# Patient Record
Sex: MALE | Race: WHITE | NOT HISPANIC OR LATINO | Employment: FULL TIME | URBAN - NONMETROPOLITAN AREA
[De-identification: names, ages, dates, MRNs, and addresses within clinical notes are randomized per-mention and may not be internally consistent; named-entity substitution may affect disease eponyms.]

---

## 2021-10-06 ENCOUNTER — HOSPITAL ENCOUNTER (EMERGENCY)
Facility: HOSPITAL | Age: 36
Discharge: LEFT BEFORE DISCHARGE | End: 2021-10-06
Admitting: EMERGENCY MEDICINE

## 2021-10-06 ENCOUNTER — APPOINTMENT (OUTPATIENT)
Dept: GENERAL RADIOLOGY | Facility: HOSPITAL | Age: 36
End: 2021-10-06

## 2021-10-06 VITALS
HEART RATE: 127 BPM | OXYGEN SATURATION: 95 % | HEIGHT: 72 IN | SYSTOLIC BLOOD PRESSURE: 159 MMHG | DIASTOLIC BLOOD PRESSURE: 114 MMHG | BODY MASS INDEX: 25.06 KG/M2 | WEIGHT: 185 LBS | TEMPERATURE: 98.5 F | RESPIRATION RATE: 20 BRPM

## 2021-10-06 PROCEDURE — 99281 EMR DPT VST MAYX REQ PHY/QHP: CPT

## 2021-10-06 PROCEDURE — 73630 X-RAY EXAM OF FOOT: CPT

## 2021-10-06 NOTE — ED NOTES
MEDICAL SCREENING:    Reason for Visit: Stepped on a nail- R foot pain    Patient initially seen in triage.  The patient was advised further evaluation and diagnostic testing will be needed, some of the treatment and testing will be initiated in the lobby in order to begin the process.  The patient will be returned to the waiting area for the time being and possibly be re-assessed by a subsequent ED provider.  The patient will be brought back to the treatment area in as timely manner as possible.       Lety Izaguirre, APRN  10/06/21 [id number]

## 2025-07-09 ENCOUNTER — OFFICE VISIT (OUTPATIENT)
Dept: PSYCHIATRY | Facility: CLINIC | Age: 40
End: 2025-07-09
Payer: COMMERCIAL

## 2025-07-09 DIAGNOSIS — Z79.899 MEDICATION MANAGEMENT: ICD-10-CM

## 2025-07-09 DIAGNOSIS — F10.20 ALCOHOL USE DISORDER, SEVERE, DEPENDENCE: Primary | ICD-10-CM

## 2025-07-09 LAB
AMPHET+METHAMPHET UR QL: NEGATIVE
AMPHETAMINES UR QL: NEGATIVE
BARBITURATES UR QL SCN: NEGATIVE
BENZODIAZ UR QL SCN: NEGATIVE
BUPRENORPHINE SERPL-MCNC: NEGATIVE NG/ML
CANNABINOIDS SERPL QL: NEGATIVE
COCAINE UR QL: NEGATIVE
MDMA UR QL SCN: NEGATIVE
METHADONE UR QL SCN: NEGATIVE
MORPHINE/OPIATES SCREEN, URINE: NEGATIVE
OXYCODONE UR QL SCN: NEGATIVE
PCP UR QL SCN: NEGATIVE
PROPOXYPH UR QL SCN: NEGATIVE
TRICYCLICS UR QL SCN: NEGATIVE
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
## 2025-07-10 ENCOUNTER — OFFICE VISIT (OUTPATIENT)
Dept: PSYCHIATRY | Facility: HOSPITAL | Age: 40
End: 2025-07-10
Payer: COMMERCIAL

## 2025-07-10 DIAGNOSIS — F10.20 ALCOHOL USE DISORDER, SEVERE, DEPENDENCE: Primary | ICD-10-CM

## 2025-07-10 PROCEDURE — H0015 ALCOHOL AND/OR DRUG SERVICES: HCPCS | Performed by: SOCIAL WORKER

## 2025-07-10 NOTE — PROGRESS NOTES
NAME: Khai Perkins  DATE: 07/10/2025    [facility] Phase I  7637-5533    Services Provided    Group Psychotherapy x 1  Education/Training x 2  Activity Therapy  x 0  Individual Therapy x 0 0 minutes  Family Therapy x 0  MD Initial Visit  x 0  MD Follow-Up   x 0    Number of participants: 6    DATA:  Patient reported he was doing alright. He was looking forward using the bulldozer they had rented this week. He always enjoyed working with heavy equipment.     Individual: No    Homework: None assigned    Group 1 (Psychotherapy: Check-ins): Therapist continued facilitation of rapport building strategies between group members. Therapist asked that each patient check in with home life and recovery efforts and identify triggers, cravings, and high risk situations that arise between group sessions.  Group members discussed barriers and benefits of attending recovery meetings.  Therapist provided empathy and support during group session. Daily Reading: The Last Lecture (2008) by Christopher Anne    Group 2  (12 Steps) Discussed the spiritual principles of open-mindedness, willingness, and humility using the 12 Step Working Guide from NA.     Group 3 (Peer Support) This hour of group was facilitated by Lily Sommers, .     ASSESSMENT:    Personal Assessment 0-10 Scale (10 worst)    Anxiety:  1 (no comment)  Depression:  0 (no comment)  Cravings: 0 (no comment)     MSE within normal limits? Yes Affect: agitated  Mood: anxious  Pt Response:  open/receptive  Engaged in activity/Process and self-disclosed: suboptimal  Applies today's group topics to self: adequate  Able to give and receive feedback: adequate  Demonstrated insightful thinking: occasional and adequate  Other pt response comments:  Patient was a positive participant. They demonstrated a relatively positive attitude, a moderate degree of motivation, and adequate insight, as evidenced by his level of engagement combined with his occasional insights.  They seemed interested and attentive. They appeared to comprehend well the concepts being discussed. The patient seemed receptive of, and willing to implement, the ideas being discussed. Their thought process appeared linear and goal directed, and their speech was regular rate and rhythm.       Community Group Engagement:  No: not interested     Reported relapse since last meeting? No: no lapse     .  Office Visit on 07/09/2025   Component Date Value Ref Range Status    Amphetamine Screen, Urine 07/09/2025 Negative  Negative Final    Refer to confirmation by [facility] in scanned documents    Barbiturates Screen, Urine 07/09/2025 Negative  Negative Final    Buprenorphine, Screen, Urine 07/09/2025 Negative  Negative Final    Benzodiazepine Screen, Urine 07/09/2025 Negative  Negative Final    Cocaine Screen, Urine 07/09/2025 Negative  Negative Final    MDMA (ECSTASY) 07/09/2025 Negative  Negative Final    Methamphetamine, Ur 07/09/2025 Negative  Negative Final    Methadone Screen, Urine 07/09/2025 Negative  Negative Final    Morphine/Opiates Screen, Urine 07/09/2025 Negative  Negative Final    Oxycodone Screen, Urine 07/09/2025 Negative  Negative Final    Phencyclidine (PCP), Urine 07/09/2025 Negative  Negative Final    Propoxyphene Scree, Urine 07/09/2025 Negative  Negative Final    Tricyclic Antidepressants Screen 07/09/2025 Negative  Negative Final    THC, Screen, Urine 07/09/2025 Negative  Negative Final       Impression/Formulation:    ICD-10-CM ICD-9-CM   1. Alcohol use disorder, severe, dependence  F10.20 303.90        CLINICAL MANEUVERING/INTERVENTIONS: Therapist utilized a person-centered approach to build and maintain rapport with group member.   Therapist promoted safe nonjudgmental environment by providing group members with unconditional positive regard.  Assisted member in processing above session content; acknowledged and normalized patient's thoughts, feelings and concerns.  Allowed patient to freely  discuss issues without interruption or judgment. Provided safe, confidential environment to facilitate the development of positive therapeutic relationship and encourage open, honest communication. Therapist assisted group member with identifying and implementing healthier coping strategies and maintaining healthier boundaries. Assisted patient in identifying risk factors which would indicate the need for higher level of care including thoughts to harm self or others and/or self-harming behavior and encouraged patient to contact this office, call 911, or present to the nearest emergency room should any of these events occur. Pt agreeable to adhere to medication regimen as prescribed and report any side effects.  Discussed crisis intervention services and means to access.  Patient adamantly and convincingly denies current suicidal or homicidal ideation or perceptual disturbance.      Therapist implemented motivational interviewing techniques to assist client with exploring and resolving ambivalence associated with commitment to change behaviors.  Yes  Therapist utilized dialectical behavior techniques to teach and model emotional regulation and relaxation.  No   Therapist applied cognitive behavioral strategies to facilitate identification of maladaptive patterns of thinking and behavior.  Yes     PLAN:    Continue [facility] IOP Phase I  Aftercare:  [facility] Outpatient Phase 2      Please note that portions of this note were completed with a voice recognition program. Efforts were made to edit dictation, but occasionally words are mistranscribed.     This document signed by Lyle Laura LCSW, July 10, 2025, 17:22 EDT

## 2025-07-11 LAB
6-ACETYLMORPHINE: NOT DETECTED NG/ML
9-DELTA-THC-COOH: NOT DETECTED NG/ML
ALCOHOL, ETHYL: NOT DETECTED MG/DL
AMPHETAMINE: NOT DETECTED NG/ML
BENZODIAZ BLD QL: NOT DETECTED NG/ML
BUPRENORPHINE SAL CFM-MCNC: NOT DETECTED NG/ML
COCAINE METABOLITE: NOT DETECTED NG/ML
CREATININE: 167.2 MG/DL
EDDP SERPL QL: NOT DETECTED NG/ML
FENTANYL-EIA: NOT DETECTED NG/ML
METHAMPHETAMINE: NOT DETECTED NG/ML
OPIATES: NOT DETECTED NG/ML
OXIDANTS: NOT DETECTED UG/ML
OXYCODONE: NOT DETECTED NG/ML
PCP: NOT DETECTED NG/ML
PH: 6.1 (ref 4.5–9)
REF LAB TEST METHOD: NORMAL
SPECIFIC GRAVITY, QUAN: 1.01 (ref 1–1.03)
THC: DETECTED NG/ML

## 2025-07-14 ENCOUNTER — OFFICE VISIT (OUTPATIENT)
Dept: PSYCHIATRY | Facility: HOSPITAL | Age: 40
End: 2025-07-14
Payer: COMMERCIAL

## 2025-07-14 DIAGNOSIS — Z79.899 MEDICATION MANAGEMENT: ICD-10-CM

## 2025-07-14 DIAGNOSIS — F10.20 ALCOHOL USE DISORDER, SEVERE, DEPENDENCE: Primary | ICD-10-CM

## 2025-07-14 PROCEDURE — H0015 ALCOHOL AND/OR DRUG SERVICES: HCPCS | Performed by: SOCIAL WORKER

## 2025-07-14 NOTE — PROGRESS NOTES
NAME: Khai Perkins  DATE: 07/14/2025    [facility] Phase I  4320-2655    Services Provided    Group Psychotherapy x 1  Education/Training x 2  Activity Therapy  x 0  Individual Therapy x 0 0 minutes  Family Therapy x 0  MD Initial Visit  x 0  MD Follow-Up   x 0    Number of participants: 4    DATA:  Patient reported that he was worn out. He had been working hard all weekend. What's more, he reported that he had wanted a beer quite badly on Friday. The cravings weren't as bad on Saturday. This had been triggered by his having worked outside in the heat all day. Normally, he would have had a cold beer after a hard day's work like that. However, his father was not drinking, and they did not finish their work until after dark. This helped him to not drink on that occasion.     Individual: No    Homework: None assigned    Group 1 (Psychotherapy: Check-ins): Therapist continued facilitation of rapport building strategies between group members. Therapist asked that each patient check in with home life and recovery efforts and identify triggers, cravings, and high risk situations that arise between group sessions.  Group members discussed barriers and benefits of attending recovery meetings.  Therapist provided empathy and support during group session. Daily Reading: Man's Search for Meaning (1946) by Alphonse Medel    Group 2  (Existential) Discussed the difference between finding meaning and pursuing expedience. Therapist facilitated discussion about the difference between the two choices.     Group 3 (Bibliotherapy) Read from Man's Search for Meaning and use it as an illustration of what it means to make meaning out of suffering.     ASSESSMENT:    Personal Assessment 0-10 Scale (10 worst)    Anxiety:  2 (no comment)  Depression:  1 (no comment)  Cravings: 2 (9 on Friday)     MSE within normal limits? Yes Affect: restricted  Mood: depressed  Pt Response:  open/receptive  Engaged in activity/Process and self-disclosed:  adequate  Applies today's group topics to self: adequate  Able to give and receive feedback: adequate  Demonstrated insightful thinking: occasional and adequate  Other pt response comments:  Patient was a positive participant. They demonstrated a relatively positive attitude, a strong degree of motivation, and adequate insight, as evidenced by his level of engagement combined with his apparent difficulty remaining awake later in the group session. They seemed interested and attentive. They appeared to comprehend well the concepts being discussed. The patient seemed receptive of, and willing to implement, the ideas being discussed. Their thought process appeared linear and goal directed, and their speech was regular rate and rhythm.       Community Group Engagement:  No: not interested    Reported relapse since last meeting? No: no lapse     .  Office Visit on 07/09/2025   Component Date Value Ref Range Status    Amphetamine Screen, Urine 07/09/2025 Negative  Negative Final    Refer to confirmation by [facility] in scanned documents    Barbiturates Screen, Urine 07/09/2025 Negative  Negative Final    Buprenorphine, Screen, Urine 07/09/2025 Negative  Negative Final    Benzodiazepine Screen, Urine 07/09/2025 Negative  Negative Final    Cocaine Screen, Urine 07/09/2025 Negative  Negative Final    MDMA (ECSTASY) 07/09/2025 Negative  Negative Final    Methamphetamine, Ur 07/09/2025 Negative  Negative Final    Methadone Screen, Urine 07/09/2025 Negative  Negative Final    Morphine/Opiates Screen, Urine 07/09/2025 Negative  Negative Final    Oxycodone Screen, Urine 07/09/2025 Negative  Negative Final    Phencyclidine (PCP), Urine 07/09/2025 Negative  Negative Final    Propoxyphene Scree, Urine 07/09/2025 Negative  Negative Final    Tricyclic Antidepressants Screen 07/09/2025 Negative  Negative Final    THC, Screen, Urine 07/09/2025 Negative  Negative Final    AMPHETAMINE 07/09/2025 Not Detected  500 ng/mL ng/mL Final     METHAMPHETAMINE 07/09/2025 Not Detected  500 ng/mL ng/mL Final    FENTANYL 07/09/2025 Not Detected  1 ng/mL ng/mL Final    Barbiturates 07/09/2025 Not Detected  200 ng/mL ng/mL Final    Benzodiazepines 07/09/2025 Not Detected  200 ng/mL ng/mL Final    BUPRENORPHINE 07/09/2025 Not Detected  5.0 ng/mL ng/mL Final    ALCOHOL, ETHYL 07/09/2025 Not Detected  10 mg/dL mg/dL Final    EDDP 07/09/2025 Not Detected  100 ng/mL ng/mL Final    Opiates 07/09/2025 Not Detected  300 ng/mL ng/mL Final    6-ACETYLMORPHINE 07/09/2025 Not Detected  10 ng/mL ng/mL Final    OXYCODONE 07/09/2025 Not Detected  100 ng/mL ng/mL Final    PCP 07/09/2025 Not Detected  25 ng/mL ng/mL Final    THC 07/09/2025 Detected  50 ng/mL ng/mL Final    Cocaine Metabolite 07/09/2025 Not Detected  150 ng/mL ng/mL Final    CREATININE 07/09/2025 167.2  >20.0 mg/dL mg/dL Final    PH 07/09/2025 6.1  4.5 - 9.0 Final    OXIDANTS 07/09/2025 Not Detected  0-200 ug/mL ug/mL Final    Specific Gravity, [place] 07/09/2025 1.013  1.003 - 1.035 Final    Reference Lab Report 07/09/2025    Final    See Full Screen for results.    9-DELTA-THC-COOH 07/09/2025 Not Detected  25 ng/mL ng/mL Final       Impression/Formulation:    ICD-10-CM ICD-9-CM   1. Alcohol use disorder, severe, dependence  F10.20 303.90   2. Medication management  Z79.899 V58.69        CLINICAL MANEUVERING/INTERVENTIONS: Therapist utilized a person-centered approach to build and maintain rapport with group member.   Therapist promoted safe nonjudgmental environment by providing group members with unconditional positive regard.  Assisted member in processing above session content; acknowledged and normalized patient's thoughts, feelings and concerns.  Allowed patient to freely discuss issues without interruption or judgment. Provided safe, confidential environment to facilitate the development of positive therapeutic relationship and encourage open, honest communication. Therapist assisted group member with  identifying and implementing healthier coping strategies and maintaining healthier boundaries. Assisted patient in identifying risk factors which would indicate the need for higher level of care including thoughts to harm self or others and/or self-harming behavior and encouraged patient to contact this office, call 911, or present to the nearest emergency room should any of these events occur. Pt agreeable to adhere to medication regimen as prescribed and report any side effects.  Discussed crisis intervention services and means to access.  Patient adamantly and convincingly denies current suicidal or homicidal ideation or perceptual disturbance.      Therapist implemented motivational interviewing techniques to assist client with exploring and resolving ambivalence associated with commitment to change behaviors.  Yes  Therapist utilized dialectical behavior techniques to teach and model emotional regulation and relaxation.  No   Therapist applied cognitive behavioral strategies to facilitate identification of maladaptive patterns of thinking and behavior.  Yes     PLAN:    Continue [facility] IOP Phase I  Aftercare:  [facility] Phase 2      Please note that portions of this note were completed with a voice recognition program. Efforts were made to edit dictation, but occasionally words are mistranscribed.     This document signed by Lyle Laura LCSW, July 14, 2025, 14:03 EDT

## 2025-07-15 LAB
6-ACETYLMORPHINE: NOT DETECTED NG/ML
ALCOHOL, ETHYL: NOT DETECTED MG/DL
AMPHETAMINE: NOT DETECTED NG/ML
BENZODIAZ BLD QL: NOT DETECTED NG/ML
BUPRENORPHINE SAL CFM-MCNC: NOT DETECTED NG/ML
COCAINE METABOLITE: NOT DETECTED NG/ML
CREATININE: 33.5 MG/DL
EDDP SERPL QL: NOT DETECTED NG/ML
FENTANYL-EIA: NOT DETECTED NG/ML
METHAMPHETAMINE: NOT DETECTED NG/ML
OPIATES: NOT DETECTED NG/ML
OXIDANTS: NOT DETECTED UG/ML
OXYCODONE: NOT DETECTED NG/ML
PCP: NOT DETECTED NG/ML
PH: 6 (ref 4.5–9)
SPECIFIC GRAVITY, QUAN: 1.01 (ref 1–1.03)
THC: NOT DETECTED NG/ML

## 2025-07-16 ENCOUNTER — OFFICE VISIT (OUTPATIENT)
Dept: PSYCHIATRY | Facility: HOSPITAL | Age: 40
End: 2025-07-16
Payer: COMMERCIAL

## 2025-07-16 DIAGNOSIS — F10.20 ALCOHOL USE DISORDER, SEVERE, DEPENDENCE: Primary | ICD-10-CM

## 2025-07-16 LAB — REF LAB TEST METHOD: NORMAL

## 2025-07-16 PROCEDURE — H0015 ALCOHOL AND/OR DRUG SERVICES: HCPCS | Performed by: SOCIAL WORKER

## 2025-07-16 NOTE — PROGRESS NOTES
Subjective   Patient ID: Khai Perkins is a 40 y.o. male referred to [facility] treatment under court order..     There were no vitals taken for this visit.  CC: Alcohol use    History of Present Illness  Khai Perkins is a 40 y.o. male who is seen today for an initial evaluation in the [facility] program.  The patient reports a long history of alcohol use. First use was at age 11 and he started drinking regularly at age 19. Over time the use increased and the patient  continued to use despite negative consequences including relationship problems, social and financial problems. The patient endorses symptoms of tolerance and withdrawals and ongoing cravings to use. Has tried to cut down and stop but has not been successful. Spends too much time and resources in pursuit of substance use. Longest period of sobriety is reported to be about a year.  Last use a half a case of beer and a fifth of bourbon about six months ago and that is when he went to shelter and was out in three weeks and after that he has been drinking beer off and on and last beer drink was three weeks ago.   Withdrawal symptoms are not reported today.     The following portions of the patient's history were reviewed and updated as appropriate: allergies, current medications, past family history, past medical history, past social history, past surgical history, and problem list.    Past Psych History: None reported.     Substance Use History: See HPI. Patient reports occasional use of thc and last use was about 3 weeks ago.     Medical History: None reported.  No past medical history on file.    Medications: No current outpatient medications on file.    Review of Systems  Gen: No fever chills  Resp: No soa  GI: No nvd    Family History Patient denies any family history of alcohol and drug use. No family history on file.    Objective   Mental Status Exam  Appearance:  clean and casually dressed, appropriate  Attitude toward clinician:  cooperative and agreeable    Speech:    Rate:  regular rate and rhythm   Volume:  normal  Motor:  no abnormal movements present  Mood:  Good  Affect:  euthymic  Thought Processes:  linear, logical, and goal directed  Thought Content:  normal  Suicidal Thoughts:  absent  Homicidal Thoughts:  absent  Perceptual Disturbance: no perceptual disturbance  Attention and Concentration:  good  Insight and Judgement:  good  Memory:  memory appears to be intact    Strengths: Motivated for treatment    Weaknesses:Substance use and Poor coping skills    Short term goals: Develop rapport and encourage compliance with treatment plan and followups. Initiate appropriate treatment and monitor for efficacy and side effects and make adjustments as indicated.    Long term goals: The patient to have complete resolution of symptoms of alcohol use.      Lab Review:   Office Visit on 07/14/2025   Component Date Value Ref Range Status    AMPHETAMINE 07/14/2025 Not Detected  500 ng/mL ng/mL Final    METHAMPHETAMINE 07/14/2025 Not Detected  500 ng/mL ng/mL Final    FENTANYL 07/14/2025 Not Detected  1 ng/mL ng/mL Final    Barbiturates 07/14/2025 Not Detected  200 ng/mL ng/mL Final    Benzodiazepines 07/14/2025 Not Detected  200 ng/mL ng/mL Final    BUPRENORPHINE 07/14/2025 Not Detected  5.0 ng/mL ng/mL Final    ALCOHOL, ETHYL 07/14/2025 Not Detected  10 mg/dL mg/dL Final    EDDP 07/14/2025 Not Detected  100 ng/mL ng/mL Final    Opiates 07/14/2025 Not Detected  300 ng/mL ng/mL Final    6-ACETYLMORPHINE 07/14/2025 Not Detected  10 ng/mL ng/mL Final    OXYCODONE 07/14/2025 Not Detected  100 ng/mL ng/mL Final    PCP 07/14/2025 Not Detected  25 ng/mL ng/mL Final    THC 07/14/2025 Not Detected  50 ng/mL ng/mL Final    Cocaine Metabolite 07/14/2025 Not Detected  150 ng/mL ng/mL Final    CREATININE 07/14/2025 33.5  >20.0 mg/dL mg/dL Final    PH 07/14/2025 6.0  4.5 - 9.0 Final    OXIDANTS 07/14/2025 Not Detected  0-200 ug/mL ug/mL Final    Specific Gravity, Celestino 07/14/2025 1.007   1.003 - 1.035 Final    Reference Lab Report 07/14/2025    Final    See Full Screen for results.       Assessment & Plan   Alcohol use disorder severe        The patient is seen today for an intake assessment for the [facility] program.   Patient will be admitted to the [facility].  Patient will be provided individual and group counseling and monitored closely for sobriety. Patient will be encouraged to learn and practice healthy coping skills and to maintain health coping skills. Patient will participate in group therapy for approximately 8-10 weeks. The clinical focus of treatment will be adding coping skills to prevent recurrence of symptoms and to manage moods. Patient will be assisted with coping skills to also manage life stressors and/or cravings. Patient will be provided with psychoeducation surrounding substance misuse and any other behavioral health concerns present during treatment. Patient has been informed of the requirement to attend ongoing support group meetings and to obtain a sponsor if needed.  Patient informed of requirement of [facility] drug screenings at each contact to ensure compliance and reinforce abstinence from all illicit drugs and alcohol.  Patient was encouraged to involve family in the family education program which will be offered weekly. Patient will meet with the staff psychiatrist on a biweekly basis for medical monitoring and, if needed, medication management. Patient will be given the option to see the medical provider each week, if needed. Patient will be assisted with development of a personal recovery plan.

## 2025-07-16 NOTE — PROGRESS NOTES
NAME: Khai Perkins  DATE: 07/16/2025    [facility] Phase I  [id number]    Services Provided    Group Psychotherapy x 2  Education/Training x 1  Activity Therapy  x 0  Individual Therapy x 0 0 minutes  Family Therapy x 0  MD Initial Visit  x 1  MD Follow-Up   x 0    Number of participants: 5    DATA:  Patient reported that court went well yesterday. However, his anxiety was increasing because he was finding his to-do list piling up. It was affecting his recovery because it made him want to drink. He told his little brother and his dad that it was making him want to drink again. They were supportive.     Individual: No    Homework: None assigned    Group 1 (Psychotherapy: Check-ins): Therapist continued facilitation of rapport building strategies between group members. Therapist asked that each patient check in with home life and recovery efforts and identify triggers, cravings, and high risk situations that arise between group sessions.  Group members discussed barriers and benefits of attending recovery meetings.  Therapist provided empathy and support during group session. Daily Reading: The Great Divorce (1945) by LISHA Nuñez    Group 2  (Relapse Prevention) Explored coping skills and source of support.     Group 3 (Relapse Prevention) Explored sources of motivation. Emphasized the importance of pursuing jaky in recovery rather than exclusively running from the pain of active addiction.     ASSESSMENT:    Personal Assessment 0-10 Scale (10 worst)    Anxiety:  8 (no comment)  Depression:  2 (no comment)  Cravings: 2 (no comment)     MSE within normal limits? Yes Affect: agitated  Mood: anxious  Pt Response:  open/receptive  Engaged in activity/Process and self-disclosed: adequate  Applies today's group topics to self: adequate  Able to give and receive feedback: adequate  Demonstrated insightful thinking: occasional and suboptimal  Other pt response comments:  Patient was a positive participant. They demonstrated a  relatively positive attitude, a moderate degree of motivation, and moderate insight, as evidenced by his level of engagement combined with his having stood on two occasions during group discussion. He must have been restless, in pain, or both, and standing gave some relief. They seemed interested and attentive. They appeared to comprehend well the concepts being discussed. The patient seemed doubtful of, and somewhat willing to implement, the ideas being discussed. Their thought process appeared linear and goal directed, and their speech was regular rate and rhythm.       Community Group Engagement:  No: not engaged    Reported relapse since last meeting? No: no lapse     .  Office Visit on 07/14/2025   Component Date Value Ref Range Status    AMPHETAMINE 07/14/2025 Not Detected  500 ng/mL ng/mL Final    METHAMPHETAMINE 07/14/2025 Not Detected  500 ng/mL ng/mL Final    FENTANYL 07/14/2025 Not Detected  1 ng/mL ng/mL Final    Barbiturates 07/14/2025 Not Detected  200 ng/mL ng/mL Final    Benzodiazepines 07/14/2025 Not Detected  200 ng/mL ng/mL Final    BUPRENORPHINE 07/14/2025 Not Detected  5.0 ng/mL ng/mL Final    ALCOHOL, ETHYL 07/14/2025 Not Detected  10 mg/dL mg/dL Final    EDDP 07/14/2025 Not Detected  100 ng/mL ng/mL Final    Opiates 07/14/2025 Not Detected  300 ng/mL ng/mL Final    6-ACETYLMORPHINE 07/14/2025 Not Detected  10 ng/mL ng/mL Final    OXYCODONE 07/14/2025 Not Detected  100 ng/mL ng/mL Final    PCP 07/14/2025 Not Detected  25 ng/mL ng/mL Final    THC 07/14/2025 Not Detected  50 ng/mL ng/mL Final    Cocaine Metabolite 07/14/2025 Not Detected  150 ng/mL ng/mL Final    CREATININE 07/14/2025 33.5  >20.0 mg/dL mg/dL Final    PH 07/14/2025 6.0  4.5 - 9.0 Final    OXIDANTS 07/14/2025 Not Detected  0-200 ug/mL ug/mL Final    Specific Gravity, Celestino 07/14/2025 1.007  1.003 - 1.035 Final    Reference Lab Report 07/14/2025    Final    See Full Screen for results.   Office Visit on 07/09/2025   Component Date  Value Ref Range Status    Amphetamine Screen, Urine 07/09/2025 Negative  Negative Final    Refer to confirmation by [facility] in scanned documents    Barbiturates Screen, Urine 07/09/2025 Negative  Negative Final    Buprenorphine, Screen, Urine 07/09/2025 Negative  Negative Final    Benzodiazepine Screen, Urine 07/09/2025 Negative  Negative Final    Cocaine Screen, Urine 07/09/2025 Negative  Negative Final    MDMA (ECSTASY) 07/09/2025 Negative  Negative Final    Methamphetamine, Ur 07/09/2025 Negative  Negative Final    Methadone Screen, Urine 07/09/2025 Negative  Negative Final    Morphine/Opiates Screen, Urine 07/09/2025 Negative  Negative Final    Oxycodone Screen, Urine 07/09/2025 Negative  Negative Final    Phencyclidine (PCP), Urine 07/09/2025 Negative  Negative Final    Propoxyphene Scree, Urine 07/09/2025 Negative  Negative Final    Tricyclic Antidepressants Screen 07/09/2025 Negative  Negative Final    THC, Screen, Urine 07/09/2025 Negative  Negative Final    AMPHETAMINE 07/09/2025 Not Detected  500 ng/mL ng/mL Final    METHAMPHETAMINE 07/09/2025 Not Detected  500 ng/mL ng/mL Final    FENTANYL 07/09/2025 Not Detected  1 ng/mL ng/mL Final    Barbiturates 07/09/2025 Not Detected  200 ng/mL ng/mL Final    Benzodiazepines 07/09/2025 Not Detected  200 ng/mL ng/mL Final    BUPRENORPHINE 07/09/2025 Not Detected  5.0 ng/mL ng/mL Final    ALCOHOL, ETHYL 07/09/2025 Not Detected  10 mg/dL mg/dL Final    EDDP 07/09/2025 Not Detected  100 ng/mL ng/mL Final    Opiates 07/09/2025 Not Detected  300 ng/mL ng/mL Final    6-ACETYLMORPHINE 07/09/2025 Not Detected  10 ng/mL ng/mL Final    OXYCODONE 07/09/2025 Not Detected  100 ng/mL ng/mL Final    PCP 07/09/2025 Not Detected  25 ng/mL ng/mL Final    THC 07/09/2025 Detected  50 ng/mL ng/mL Final    Cocaine Metabolite 07/09/2025 Not Detected  150 ng/mL ng/mL Final    CREATININE 07/09/2025 167.2  >20.0 mg/dL mg/dL Final    PH 07/09/2025 6.1  4.5 - 9.0 Final    OXIDANTS 07/09/2025  Not Detected  0-200 ug/mL ug/mL Final    Specific Gravity, Celestino 07/09/2025 1.013  1.003 - 1.035 Final    Reference Lab Report 07/09/2025    Final    See Full Screen for results.    9-DELTA-THC-COOH 07/09/2025 Not Detected  25 ng/mL ng/mL Final       Impression/Formulation:    ICD-10-CM ICD-9-CM   1. Alcohol use disorder, severe, dependence  F10.20 303.90        CLINICAL MANEUVERING/INTERVENTIONS: Therapist utilized a person-centered approach to build and maintain rapport with group member.   Therapist promoted safe nonjudgmental environment by providing group members with unconditional positive regard.  Assisted member in processing above session content; acknowledged and normalized patient's thoughts, feelings and concerns.  Allowed patient to freely discuss issues without interruption or judgment. Provided safe, confidential environment to facilitate the development of positive therapeutic relationship and encourage open, honest communication. Therapist assisted group member with identifying and implementing healthier coping strategies and maintaining healthier boundaries. Assisted patient in identifying risk factors which would indicate the need for higher level of care including thoughts to harm self or others and/or self-harming behavior and encouraged patient to contact this office, call 911, or present to the nearest emergency room should any of these events occur. Pt agreeable to adhere to medication regimen as prescribed and report any side effects.  Discussed crisis intervention services and means to access.  Patient adamantly and convincingly denies current suicidal or homicidal ideation or perceptual disturbance.      Therapist implemented motivational interviewing techniques to assist client with exploring and resolving ambivalence associated with commitment to change behaviors.  Yes  Therapist utilized dialectical behavior techniques to teach and model emotional regulation and relaxation.  No    Therapist applied cognitive behavioral strategies to facilitate identification of maladaptive patterns of thinking and behavior.  Yes     PLAN:    Continue [facility] Phase I  Aftercare:  [facility] Phase 2      Please note that portions of this note were completed with a voice recognition program. Efforts were made to edit dictation, but occasionally words are mistranscribed.     This document signed by Lyle Laura LCSW, July 16, 2025, 14:08 EDT

## 2025-07-17 ENCOUNTER — OFFICE VISIT (OUTPATIENT)
Dept: PSYCHIATRY | Facility: HOSPITAL | Age: 40
End: 2025-07-17
Payer: COMMERCIAL

## 2025-07-17 DIAGNOSIS — F10.20 ALCOHOL USE DISORDER, SEVERE, DEPENDENCE: Primary | ICD-10-CM

## 2025-07-17 NOTE — PROGRESS NOTES
[address] [address]  [phone]    Intensive Outpatient Program for Chemical Dependence ([facility] IOP)      Individualized Treatment Plan    DATE: 07/17/2025  TIME: 1100         Long Term Goal:  Khai will establish a sustained recovery and will maintain total abstinence from mind-altering substances other than what is prescribed and/or approved by the attending physician. Patient will learn, practice, and utilize behavioral and cognitive coping skills to help maintain sobriety.    Patient Care Needs:  Khai presents with alcohol use disorder and is at high risk for relapse without continued treatment.  Patient has a history of using drugs/alcohol until intoxicated or passed out and has been unable to stop or cut down use of alcohol/drugs once starting, despite verbalized desire to do so, and the negative consequences from continued use.  Patient's use has negatively impacted social, occupational, and/or physical functioning. He also reported that he wanted to work on how he came across to people sometimes.     Objectives:      1.  Patient will participate in a medical evaluation to assess the effects of chemical dependence and will cooperate with an evaluation by the attending psychiatrist or psychiatric nurse practitioner for psychotropic medication if appropriate. Patient reported he did not go to the doctor very often. He was unsure about scheduling a follow up appointment at this time. He will let the treatment team know if he decides he wants to have an appointment scheduled.     2.  Patient will adhere to the IOP group rules and guidelines.    3.  Patient will attend group sessions as scheduled.  Patient will notify therapist and support staff of any absences or tardies.  Patient will provide a valid and verifiable excuse for absences to be considered excused.    4.  Patient will participate in random drug and alcohol screenings and will exhibit negative  results on said screenings.    5.  Patient will identify high risk situations, people, and places to avoid or manage in order to maintain sobriety. He stayed close to his family, and they were respectful of his recovery.     6.  Patient will participate in group discussions by giving and receiving feedback appropriately.    7.  Patient will develop a positive network of support by attending recovery groups or other spiritual fellowships each week outside of IOP group and by exploring/obtaining/maintaining sponsorship. He had been to AA a few times years ago, and he didn't like it all that much. He was open to trying again at some point.     8.  Patient will identify, practice, and implement at least 5 healthy coping strategies to manage difficult emotions while remaining abstinent. Staying busy working was a typical coping skill for him. Carpentry, guitar, hunting, fishing, hiking, or just being outside.     9.  Patient will develop relapse prevention skills and be able to identify and share them with the group in the form of a relapse prevention plan.    10.  Patient will develop a personal recovery plan and share it with the group prior to discharge.    11.  Patient will offer family participation in family education groups, if appropriate. He thought about bringing in his brother or his best friend.     12.  Patient will exhibit increased motivation to change as assessed by observable behaviors in conjunction with the ASAM assessment tool.    13.  Patient will utilize healthy coping skills to manage depressive and anxious symptoms without using alcohol or other mind-altering substances and will exhibit decreased score on the PHQ-9 and YASMANI-7.    Interventions:  Patient will attend biweekly appointments with the attending psychiatrist or psychiatric nurse practitioner for medication evaluation and management unless scheduled otherwise.  Patient will be referred to a medical provider for a physical examination if  he/she is not already established with a medical provider.  Patient will comply with recommendations and appointments with his/her medical treatment team, including medication management.  Therapist will introduce and review IOP group rules, and patient will be provided a hard copy of the group rules upon entering the program.  IOP group sessions will be offered 3-4 times per week, Mondays, Tuesdays, Wednesdays and Thursdays during Phase I of treatment, with the exception of some federal holidays. Patient will be provided with random and regular drug and alcohol screenings. Therapist will utilize motivational interviewing techniques to assist patient with exploring and resolving ambivalence associated with commitment to change behaviors related to substance use and addiction.  Therapist will utilize behavioral and cognitive techniques to assist patient with identifying and recognizing patterns of irrational beliefs that contribute to patient's risk for continued substance abuse and relapse.  Therapist will provide psychoeducation to assist patient in increasing knowledge on the disease concept and the effects of chemical dependence.  Therapist will provide and utilize evidenced-based recovery literature to assist patient in identifying healthier coping strategies.  Therapist will assist patient in developing a relapse prevention plan and a personal recovery plan.  Therapist will assist patient with exploring self-help strategies and beginning work on the 12 Steps.  Therapist will encourage patient to explore, obtain, or maintain sponsorship and/or interpersonal connection with [facility] fellowships.  Therapist will offer family education groups, if appropriate, and will encourage patient to invite family member(s) to participate. Treatment team members will provide patient with progress reports as needed.    Team Members:  Patient - Khai Perkins  Medical Provider - Zoe Painter MD  [facility] Licensed Therapist -  Lyle Laura, AKIL  IOP Director - Gilberto Stout LCSW, [facility]  Support Staff

## 2025-07-17 NOTE — PROGRESS NOTES
"   NAME: Khai Perkins  DATE: 07/17/2025    [facility] Phase I  [id number]    Services Provided    Group Psychotherapy x 1  Education/Training x 2  Activity Therapy  x 0  Individual Therapy x 1 30 minutes  Family Therapy x 0  MD Initial Visit  x 0  MD Follow-Up   x 0    Number of participants: 5    DATA:  Patient reported he was tired today. He had worked again until dark, and there was no end in sight. However, it was looking more finished than it did before, so this was satisfying. It seemed he was adept at recognizing the nuance of the situation. It was both stressful and satisfying for him.     Individual: Yes, describe: Initiated treatment plan. During the individual session, he reported, unprompted, that he came across as an \"ass hole\" sometimes. He wasn't too concerned about this, but he also recognized it as something he needed to reign in at times. He seemed to be thinking realistically about this. It is also notable that he reported abstinence to be his goal, whereas this had not necessarily been his goal during the intake session.     Homework: None assigned    Group 1 (Psychotherapy: Check-ins): Therapist continued facilitation of rapport building strategies between group members. Therapist asked that each patient check in with home life and recovery efforts and identify triggers, cravings, and high risk situations that arise between group sessions.  Group members discussed barriers and benefits of attending recovery meetings.  Therapist provided empathy and support during group session. Daily Reading: None    Group 2  (CBT) Explored the costs and benefits of demanding perfection compared to the costs and benefits of accepting progress.     Group 3 (Peer Support) This hour of group was facilitated by Lily Sommers, .     ASSESSMENT:    Personal Assessment 0-10 Scale (10 worst)    Anxiety:  5 (no comment)  Depression:  2 (no comment)  Cravings: 0 (no comment)     MSE within normal limits? " Yes Affect: somber Mood: calm  Pt Response:  open/receptive  Engaged in activity/Process and self-disclosed: adequate  Applies today's group topics to self: adequate  Able to give and receive feedback: adequate  Demonstrated insightful thinking: consistent and exceptional  Other pt response comments:  Patient was a positive participant. They demonstrated a relatively positive attitude, a strong degree of motivation, and exceptional insight, as evidenced by his level of engagement combined with the level of humility he demonstrated in recognizing his own tendencies. They seemed interested and attentive. They appeared to comprehend well the concepts being discussed. The patient seemed receptive of, and willing to implement, the ideas being discussed. Their thought process appeared linear and goal directed, and their speech was regular rate and rhythm.       Community Group Engagement:  No: not yet engaged    Reported relapse since last meeting? No: no lapse     .  Office Visit on 07/14/2025   Component Date Value Ref Range Status    AMPHETAMINE 07/14/2025 Not Detected  500 ng/mL ng/mL Final    METHAMPHETAMINE 07/14/2025 Not Detected  500 ng/mL ng/mL Final    FENTANYL 07/14/2025 Not Detected  1 ng/mL ng/mL Final    Barbiturates 07/14/2025 Not Detected  200 ng/mL ng/mL Final    Benzodiazepines 07/14/2025 Not Detected  200 ng/mL ng/mL Final    BUPRENORPHINE 07/14/2025 Not Detected  5.0 ng/mL ng/mL Final    ALCOHOL, ETHYL 07/14/2025 Not Detected  10 mg/dL mg/dL Final    EDDP 07/14/2025 Not Detected  100 ng/mL ng/mL Final    Opiates 07/14/2025 Not Detected  300 ng/mL ng/mL Final    6-ACETYLMORPHINE 07/14/2025 Not Detected  10 ng/mL ng/mL Final    OXYCODONE 07/14/2025 Not Detected  100 ng/mL ng/mL Final    PCP 07/14/2025 Not Detected  25 ng/mL ng/mL Final    THC 07/14/2025 Not Detected  50 ng/mL ng/mL Final    Cocaine Metabolite 07/14/2025 Not Detected  150 ng/mL ng/mL Final    CREATININE 07/14/2025 33.5  >20.0 mg/dL mg/dL  Final    PH 07/14/2025 6.0  4.5 - 9.0 Final    OXIDANTS 07/14/2025 Not Detected  0-200 ug/mL ug/mL Final    Specific Gravity, Celestino 07/14/2025 1.007  1.003 - 1.035 Final    Reference Lab Report 07/14/2025    Final    See Full Screen for results.   Office Visit on 07/09/2025   Component Date Value Ref Range Status    Amphetamine Screen, Urine 07/09/2025 Negative  Negative Final    Refer to confirmation by [facility] in scanned documents    Barbiturates Screen, Urine 07/09/2025 Negative  Negative Final    Buprenorphine, Screen, Urine 07/09/2025 Negative  Negative Final    Benzodiazepine Screen, Urine 07/09/2025 Negative  Negative Final    Cocaine Screen, Urine 07/09/2025 Negative  Negative Final    MDMA (ECSTASY) 07/09/2025 Negative  Negative Final    Methamphetamine, Ur 07/09/2025 Negative  Negative Final    Methadone Screen, Urine 07/09/2025 Negative  Negative Final    Morphine/Opiates Screen, Urine 07/09/2025 Negative  Negative Final    Oxycodone Screen, Urine 07/09/2025 Negative  Negative Final    Phencyclidine (PCP), Urine 07/09/2025 Negative  Negative Final    Propoxyphene Scree, Urine 07/09/2025 Negative  Negative Final    Tricyclic Antidepressants Screen 07/09/2025 Negative  Negative Final    THC, Screen, Urine 07/09/2025 Negative  Negative Final    AMPHETAMINE 07/09/2025 Not Detected  500 ng/mL ng/mL Final    METHAMPHETAMINE 07/09/2025 Not Detected  500 ng/mL ng/mL Final    FENTANYL 07/09/2025 Not Detected  1 ng/mL ng/mL Final    Barbiturates 07/09/2025 Not Detected  200 ng/mL ng/mL Final    Benzodiazepines 07/09/2025 Not Detected  200 ng/mL ng/mL Final    BUPRENORPHINE 07/09/2025 Not Detected  5.0 ng/mL ng/mL Final    ALCOHOL, ETHYL 07/09/2025 Not Detected  10 mg/dL mg/dL Final    EDDP 07/09/2025 Not Detected  100 ng/mL ng/mL Final    Opiates 07/09/2025 Not Detected  300 ng/mL ng/mL Final    6-ACETYLMORPHINE 07/09/2025 Not Detected  10 ng/mL ng/mL Final    OXYCODONE 07/09/2025 Not Detected  100 ng/mL ng/mL  Final    PCP 07/09/2025 Not Detected  25 ng/mL ng/mL Final    THC 07/09/2025 Detected  50 ng/mL ng/mL Final    Cocaine Metabolite 07/09/2025 Not Detected  150 ng/mL ng/mL Final    CREATININE 07/09/2025 167.2  >20.0 mg/dL mg/dL Final    PH 07/09/2025 6.1  4.5 - 9.0 Final    OXIDANTS 07/09/2025 Not Detected  0-200 ug/mL ug/mL Final    Specific Gravity, Celestino 07/09/2025 1.013  1.003 - 1.035 Final    Reference Lab Report 07/09/2025    Final    See Full Screen for results.    9-DELTA-THC-COOH 07/09/2025 Not Detected  25 ng/mL ng/mL Final       Impression/Formulation:    ICD-10-CM ICD-9-CM   1. Alcohol use disorder, severe, dependence  F10.20 303.90        CLINICAL MANEUVERING/INTERVENTIONS: Therapist utilized a person-centered approach to build and maintain rapport with group member.   Therapist promoted safe nonjudgmental environment by providing group members with unconditional positive regard.  Assisted member in processing above session content; acknowledged and normalized patient's thoughts, feelings and concerns.  Allowed patient to freely discuss issues without interruption or judgment. Provided safe, confidential environment to facilitate the development of positive therapeutic relationship and encourage open, honest communication. Therapist assisted group member with identifying and implementing healthier coping strategies and maintaining healthier boundaries. Assisted patient in identifying risk factors which would indicate the need for higher level of care including thoughts to harm self or others and/or self-harming behavior and encouraged patient to contact this office, call 911, or present to the nearest emergency room should any of these events occur. Pt agreeable to adhere to medication regimen as prescribed and report any side effects.  Discussed crisis intervention services and means to access.  Patient adamantly and convincingly denies current suicidal or homicidal ideation or perceptual disturbance.       Therapist implemented motivational interviewing techniques to assist client with exploring and resolving ambivalence associated with commitment to change behaviors.  Yes  Therapist utilized dialectical behavior techniques to teach and model emotional regulation and relaxation.  No   Therapist applied cognitive behavioral strategies to facilitate identification of maladaptive patterns of thinking and behavior.  Yes     PLAN:    [facility]  Aftercare:  [facility] CD Outpatient Phase 2      Please note that portions of this note were completed with a voice recognition program. Efforts were made to edit dictation, but occasionally words are mistranscribed.     This document signed by Lyle Laura LCSW, July 17, 2025, 14:59 EDT

## 2025-07-21 ENCOUNTER — OFFICE VISIT (OUTPATIENT)
Dept: PSYCHIATRY | Facility: HOSPITAL | Age: 40
End: 2025-07-21
Payer: COMMERCIAL

## 2025-07-21 DIAGNOSIS — Z79.899 MEDICATION MANAGEMENT: ICD-10-CM

## 2025-07-21 DIAGNOSIS — F10.20 ALCOHOL USE DISORDER, SEVERE, DEPENDENCE: Primary | ICD-10-CM

## 2025-07-21 PROCEDURE — S9480 INTENSIVE OUTPATIENT PSYCHIA: HCPCS

## 2025-07-22 NOTE — PROGRESS NOTES
"NAME: Khai Perkins  DATE: 07/21/2025    [facility] Phase I  [id number]    Services Provided    Group Psychotherapy x 1  Education/Training x 2  Activity Therapy  x 0  Individual Therapy x 0 0 minutes  Family Therapy x 0  MD Initial Visit  x 0  MD Follow-Up   x 0    Number of participants: 4    DATA:  Patient shared that he's feeling drained today. His brother is very sick and at [facility]. Patient reports his brother has been being treated for lyme disease for the last month. He was getting better until this weekend, when he began rapidly deteriorating. Patient reports he was up all night Friday night after driving him to [place] himself because the hospital was unable to secure transport. Patient reports he did think about drinking on Saturday but denies any consumption of alcohol. He reports his dad and brother being home helped keep him from drinking.     Individual: No    Homework: None assigned    Group 1 (Psychotherapy: Check-ins): Therapist continued facilitation of rapport building strategies between group members. Therapist asked that each patient check in with home life and recovery efforts and identify triggers, cravings, and high risk situations that arise between group sessions.  Group members discussed barriers and benefits of attending recovery meetings.  Therapist provided empathy and support during group session. Daily Reading: None    Group 2  (Psychoeducation) Group members began working on \"Motivation and Change: Recovery Workbook.\" They shared their personal definitions of motivation and completed worksheets titled \"What I Like and Don't Like about Substance Use\" and \"How Does my Substance Use Affect me?\"     Group 3 (Education) Group members received peer support and education from Lily Sommers.     ASSESSMENT:    Personal Assessment 0-10 Scale (10 worst)    Anxiety:  10   Depression:  8   Cravings: 0      MSE within normal limits? Yes Affect: somber Mood: depressed and anxious  Pt Response:  " open/receptive  Engaged in activity/Process and self-disclosed: moderate  Applies today's group topics to self: moderate  Able to give and receive feedback: moderate  Demonstrated insightful thinking: consistent and moderate  Other pt response comments:  Patient was a positive participant. They demonstrated a relatively positive attitude, a moderate degree of motivation, and mild insight, as evidenced by their insights shared combined with level of engagement. They seemed attentive. They appeared to comprehend well the concepts being discussed. The patient seemed receptive of, and willing to implement, the ideas being discussed. Their thought process appeared linear and goal directed, and their speech was regular rate and rhythm.       Community Group Engagement:  No:      Reported relapse since last meeting? No:      ASAM Dimensions:  I.    Intoxication/Withdrawal:  0    II.   Medical Conditions/Complications:  0   III.  Behavioral/Emotional/Cognitive: 2   IV.  Readiness to Change: 2   V.   Relapse Risk: 2   VI:  Recovery Environment: 1   Total ASAM Score = 7      .  Office Visit on 07/21/2025   Component Date Value Ref Range Status    Amphetamine Screen, Urine 07/22/2025 Negative  Negative Final    Refer to confirmation by [facility] in scanned documents    Barbiturates Screen, Urine 07/22/2025 Negative  Negative Final    Buprenorphine, Screen, Urine 07/22/2025 Negative  Negative Final    Benzodiazepine Screen, Urine 07/22/2025 Negative  Negative Final    Cocaine Screen, Urine 07/22/2025 Negative  Negative Final    MDMA (ECSTASY) 07/22/2025 Negative  Negative Final    Methamphetamine, Ur 07/22/2025 Negative  Negative Final    Methadone Screen, Urine 07/22/2025 Negative  Negative Final    Morphine/Opiates Screen, Urine 07/22/2025 Negative  Negative Final    Oxycodone Screen, Urine 07/22/2025 Negative  Negative Final    Phencyclidine (PCP), Urine 07/22/2025 Negative  Negative Final    Propoxyphene Scree, Urine  07/22/2025 Negative  Negative Final    Tricyclic Antidepressants Screen 07/22/2025 Negative  Negative Final    THC, Screen, Urine 07/22/2025 Negative  Negative Final   Office Visit on 07/14/2025   Component Date Value Ref Range Status    Amphetamine Screen, Urine 07/17/2025 Negative  Negative Final    Refer to confirmation by [facility] in scanned documents    Barbiturates Screen, Urine 07/17/2025 Negative  Negative Final    Buprenorphine, Screen, Urine 07/17/2025 Negative  Negative Final    Benzodiazepine Screen, Urine 07/17/2025 Negative  Negative Final    Cocaine Screen, Urine 07/17/2025 Negative  Negative Final    MDMA (ECSTASY) 07/17/2025 Negative  Negative Final    Methamphetamine, Ur 07/17/2025 Negative  Negative Final    Methadone Screen, Urine 07/17/2025 Negative  Negative Final    Morphine/Opiates Screen, Urine 07/17/2025 Negative  Negative Final    Oxycodone Screen, Urine 07/17/2025 Negative  Negative Final    Phencyclidine (PCP), Urine 07/17/2025 Negative  Negative Final    Propoxyphene Scree, Urine 07/17/2025 Negative  Negative Final    Tricyclic Antidepressants Screen 07/17/2025 Negative  Negative Final    THC, Screen, Urine 07/17/2025 Negative  Negative Final    AMPHETAMINE 07/14/2025 Not Detected  500 ng/mL ng/mL Final    METHAMPHETAMINE 07/14/2025 Not Detected  500 ng/mL ng/mL Final    FENTANYL 07/14/2025 Not Detected  1 ng/mL ng/mL Final    Barbiturates 07/14/2025 Not Detected  200 ng/mL ng/mL Final    Benzodiazepines 07/14/2025 Not Detected  200 ng/mL ng/mL Final    BUPRENORPHINE 07/14/2025 Not Detected  5.0 ng/mL ng/mL Final    ALCOHOL, ETHYL 07/14/2025 Not Detected  10 mg/dL mg/dL Final    EDDP 07/14/2025 Not Detected  100 ng/mL ng/mL Final    Opiates 07/14/2025 Not Detected  300 ng/mL ng/mL Final    6-ACETYLMORPHINE 07/14/2025 Not Detected  10 ng/mL ng/mL Final    OXYCODONE 07/14/2025 Not Detected  100 ng/mL ng/mL Final    PCP 07/14/2025 Not Detected  25 ng/mL ng/mL Final    THC 07/14/2025 Not  Detected  50 ng/mL ng/mL Final    Cocaine Metabolite 07/14/2025 Not Detected  150 ng/mL ng/mL Final    CREATININE 07/14/2025 33.5  >20.0 mg/dL mg/dL Final    PH 07/14/2025 6.0  4.5 - 9.0 Final    OXIDANTS 07/14/2025 Not Detected  0-200 ug/mL ug/mL Final    Specific Gravity, Celestino 07/14/2025 1.007  1.003 - 1.035 Final    Reference Lab Report 07/14/2025    Final    See Full Screen for results.   Office Visit on 07/09/2025   Component Date Value Ref Range Status    Amphetamine Screen, Urine 07/09/2025 Negative  Negative Final    Refer to confirmation by [facility] in scanned documents    Barbiturates Screen, Urine 07/09/2025 Negative  Negative Final    Buprenorphine, Screen, Urine 07/09/2025 Negative  Negative Final    Benzodiazepine Screen, Urine 07/09/2025 Negative  Negative Final    Cocaine Screen, Urine 07/09/2025 Negative  Negative Final    MDMA (ECSTASY) 07/09/2025 Negative  Negative Final    Methamphetamine, Ur 07/09/2025 Negative  Negative Final    Methadone Screen, Urine 07/09/2025 Negative  Negative Final    Morphine/Opiates Screen, Urine 07/09/2025 Negative  Negative Final    Oxycodone Screen, Urine 07/09/2025 Negative  Negative Final    Phencyclidine (PCP), Urine 07/09/2025 Negative  Negative Final    Propoxyphene Scree, Urine 07/09/2025 Negative  Negative Final    Tricyclic Antidepressants Screen 07/09/2025 Negative  Negative Final    THC, Screen, Urine 07/09/2025 Negative  Negative Final    AMPHETAMINE 07/09/2025 Not Detected  500 ng/mL ng/mL Final    METHAMPHETAMINE 07/09/2025 Not Detected  500 ng/mL ng/mL Final    FENTANYL 07/09/2025 Not Detected  1 ng/mL ng/mL Final    Barbiturates 07/09/2025 Not Detected  200 ng/mL ng/mL Final    Benzodiazepines 07/09/2025 Not Detected  200 ng/mL ng/mL Final    BUPRENORPHINE 07/09/2025 Not Detected  5.0 ng/mL ng/mL Final    ALCOHOL, ETHYL 07/09/2025 Not Detected  10 mg/dL mg/dL Final    EDDP 07/09/2025 Not Detected  100 ng/mL ng/mL Final    Opiates 07/09/2025 Not  Detected  300 ng/mL ng/mL Final    6-ACETYLMORPHINE 07/09/2025 Not Detected  10 ng/mL ng/mL Final    OXYCODONE 07/09/2025 Not Detected  100 ng/mL ng/mL Final    PCP 07/09/2025 Not Detected  25 ng/mL ng/mL Final    THC 07/09/2025 Detected  50 ng/mL ng/mL Final    Cocaine Metabolite 07/09/2025 Not Detected  150 ng/mL ng/mL Final    CREATININE 07/09/2025 167.2  >20.0 mg/dL mg/dL Final    PH 07/09/2025 6.1  4.5 - 9.0 Final    OXIDANTS 07/09/2025 Not Detected  0-200 ug/mL ug/mL Final    Specific Gravity, Celestino 07/09/2025 1.013  1.003 - 1.035 Final    Reference Lab Report 07/09/2025    Final    See Full Screen for results.    9-DELTA-THC-COOH 07/09/2025 Not Detected  25 ng/mL ng/mL Final       Impression/Formulation:    ICD-10-CM ICD-9-CM   1. Alcohol use disorder, severe, dependence  F10.20 303.90   2. Medication management  Z79.899 V58.69        CLINICAL MANEUVERING/INTERVENTIONS: Therapist utilized a person-centered approach to build and maintain rapport with group member.   Therapist promoted safe nonjudgmental environment by providing group members with unconditional positive regard.  Assisted member in processing above session content; acknowledged and normalized patient's thoughts, feelings and concerns.  Allowed patient to freely discuss issues without interruption or judgment. Provided safe, confidential environment to facilitate the development of positive therapeutic relationship and encourage open, honest communication. Therapist assisted group member with identifying and implementing healthier coping strategies and maintaining healthier boundaries. Assisted patient in identifying risk factors which would indicate the need for higher level of care including thoughts to harm self or others and/or self-harming behavior and encouraged patient to contact this office, call 911, or present to the nearest emergency room should any of these events occur. Pt agreeable to adhere to medication regimen as prescribed and  report any side effects.  Discussed crisis intervention services and means to access.  Patient adamantly and convincingly denies current suicidal or homicidal ideation or perceptual disturbance.      Therapist implemented motivational interviewing techniques to assist client with exploring and resolving ambivalence associated with commitment to change behaviors.  Yes  Therapist utilized dialectical behavior techniques to teach and model emotional regulation and relaxation.  No   Therapist applied cognitive behavioral strategies to facilitate identification of maladaptive patterns of thinking and behavior.  Yes     PLAN:    Continue [facility] CD IOP Phase I  Aftercare:  [facility] CD Outpatient Phase 2      Please note that portions of this note were completed with a voice recognition program. Efforts were made to edit dictation, but occasionally words are mistranscribed.     This document signed by Freda Good, July 22, 2025, 09:31 EDT

## 2025-07-23 LAB
6-ACETYLMORPHINE: NOT DETECTED NG/ML
ALCOHOL, ETHYL: NOT DETECTED MG/DL
AMPHETAMINE: NOT DETECTED NG/ML
BENZODIAZ BLD QL: NOT DETECTED NG/ML
BUPRENORPHINE SAL CFM-MCNC: NOT DETECTED NG/ML
COCAINE METABOLITE: NOT DETECTED NG/ML
CREATININE: 78.4 MG/DL
EDDP SERPL QL: NOT DETECTED NG/ML
FENTANYL-EIA: NOT DETECTED NG/ML
METHAMPHET/CREAT UR: 733 NG/ML
METHAMPHETAMINE: DETECTED NG/ML
OPIATES: NOT DETECTED NG/ML
OXIDANTS: NOT DETECTED UG/ML
OXYCODONE: NOT DETECTED NG/ML
PCP: NOT DETECTED NG/ML
PH: 6.2 (ref 4.5–9)
REF LAB TEST METHOD: NORMAL
SPECIFIC GRAVITY, QUAN: 1.01 (ref 1–1.03)
THC: NOT DETECTED NG/ML

## 2025-07-24 ENCOUNTER — OFFICE VISIT (OUTPATIENT)
Dept: PSYCHIATRY | Facility: HOSPITAL | Age: 40
End: 2025-07-24
Payer: COMMERCIAL

## 2025-07-24 DIAGNOSIS — F41.9 ANXIETY DISORDER, UNSPECIFIED TYPE: ICD-10-CM

## 2025-07-24 DIAGNOSIS — F10.20 ALCOHOL USE DISORDER, SEVERE, DEPENDENCE: Primary | ICD-10-CM

## 2025-07-24 PROCEDURE — S9480 INTENSIVE OUTPATIENT PSYCHIA: HCPCS

## 2025-07-24 RX ORDER — HYDROXYZINE PAMOATE 25 MG/1
25 CAPSULE ORAL 3 TIMES DAILY PRN
Qty: 90 CAPSULE | Refills: 0 | Status: SHIPPED | OUTPATIENT
Start: 2025-07-24 | End: 2025-08-23

## 2025-07-24 NOTE — PROGRESS NOTES
Adult CD IOP Group      Date: July 24, 2025  Time: [id number]    Type of Group:  Peer Support    Group  Content: Forgiveness    Patient Response: today we went over the 4 phases of forgiveness. Gave patient worksheets to work on over the weekend to go over in class on Tuesday.         Lily Sommers MA  07/24/25  12:08 EDT

## 2025-07-24 NOTE — PROGRESS NOTES
Subjective   Patient ID: Khai Perkins is a 40 y.o. male is here today for follow-up..     CC: Anxiety, alcohol use    There were no vitals taken for this visit.    Patient has no known allergies.    History of Present Illness  The patient is seen in the [facility] program for a follow up. He states he has been under a lot of stress as his younger brother is sick and was in the hospital in [place] for the last week, and he is home now and he cannot walk and doesn't have a clear diagnosis, and they have been looking at Multiple Sclerosis and Guillain [place]. The patient states it is making him very anxious and he had a relapse on three days ago on Monday and drank about a 8 beers and a pint of bourbon.   He states he has not had any alcohol since then. Denies any active withdrawals and has some craving.   He denies feeling hopeless or suicidal.  He would like to take medication to help with anxiety.     The following portions of the patient's history were reviewed and updated as appropriate: allergies, current medications, past family history, past medical history, past social history, past surgical history, and problem list.    PFSH: Patient states he lives close to his brother and visits him everyday. 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     Review of Systems  Gen: No fever, chills  Resp: No soa  GI: No nvd    Objective   Mental Status Exam  Appearance:  clean and casually dressed, appropriate  Attitude toward clinician:  cooperative and agreeable   Speech:    Rate:  regular rate and rhythm   Volume:  normal  Motor:  no abnormal movements present  Mood:  Anxious  Affect:  restricted  Thought Processes:  linear, logical, and goal directed  Thought Content:  normal  Suicidal Thoughts:  absent  Homicidal Thoughts:  absent  Perceptual Disturbance: no perceptual disturbance  Attention and Concentration:  good  Insight and Judgement:  good  Memory:  memory appears to be intact    MEDICATION ISSUES:  No  current outpatient medications on file prior to visit.     No current facility-administered medications on file prior to visit.       Lab Review:   Office Visit on 07/21/2025   Component Date Value Ref Range Status    Amphetamine Screen, Urine 07/22/2025 Negative  Negative Final    Refer to confirmation by [facility] in scanned documents    Barbiturates Screen, Urine 07/22/2025 Negative  Negative Final    Buprenorphine, Screen, Urine 07/22/2025 Negative  Negative Final    Benzodiazepine Screen, Urine 07/22/2025 Negative  Negative Final    Cocaine Screen, Urine 07/22/2025 Negative  Negative Final    MDMA (ECSTASY) 07/22/2025 Negative  Negative Final    Methamphetamine, Ur 07/22/2025 Negative  Negative Final    Methadone Screen, Urine 07/22/2025 Negative  Negative Final    Morphine/Opiates Screen, Urine 07/22/2025 Negative  Negative Final    Oxycodone Screen, Urine 07/22/2025 Negative  Negative Final    Phencyclidine (PCP), Urine 07/22/2025 Negative  Negative Final    Propoxyphene Scree, Urine 07/22/2025 Negative  Negative Final    Tricyclic Antidepressants Screen 07/22/2025 Negative  Negative Final    THC, Screen, Urine 07/22/2025 Negative  Negative Final    AMPHETAMINE 07/21/2025 Not Detected  500 ng/mL ng/mL Final    METHAMPHETAMINE 07/21/2025 Detected  500 ng/mL ng/mL Final    FENTANYL 07/21/2025 Not Detected  1 ng/mL ng/mL Final    Barbiturates 07/21/2025 Not Detected  200 ng/mL ng/mL Final    Benzodiazepines 07/21/2025 Not Detected  200 ng/mL ng/mL Final    BUPRENORPHINE 07/21/2025 Not Detected  5.0 ng/mL ng/mL Final    ALCOHOL, ETHYL 07/21/2025 Not Detected  10 mg/dL mg/dL Final    EDDP 07/21/2025 Not Detected  100 ng/mL ng/mL Final    Opiates 07/21/2025 Not Detected  300 ng/mL ng/mL Final    6-ACETYLMORPHINE 07/21/2025 Not Detected  10 ng/mL ng/mL Final    OXYCODONE 07/21/2025 Not Detected  100 ng/mL ng/mL Final    PCP 07/21/2025 Not Detected  25 ng/mL ng/mL Final    THC 07/21/2025 Not Detected  50 ng/mL ng/mL  Final    Cocaine Metabolite 07/21/2025 Not Detected  150 ng/mL ng/mL Final    CREATININE 07/21/2025 78.4  >20.0 mg/dL mg/dL Final    PH 07/21/2025 6.2  4.5 - 9.0 Final    OXIDANTS 07/21/2025 Not Detected  0-200 ug/mL ug/mL Final    Specific Gravity, Celestino 07/21/2025 1.010  1.003 - 1.035 Final    Reference Lab Report 07/21/2025    Final    See Full Screen for results.    METHAMPHETAMINE 07/21/2025 733  100 ng/mL ng/mL Final   Office Visit on 07/14/2025   Component Date Value Ref Range Status    Amphetamine Screen, Urine 07/17/2025 Negative  Negative Final    Refer to confirmation by [facility] in scanned documents    Barbiturates Screen, Urine 07/17/2025 Negative  Negative Final    Buprenorphine, Screen, Urine 07/17/2025 Negative  Negative Final    Benzodiazepine Screen, Urine 07/17/2025 Negative  Negative Final    Cocaine Screen, Urine 07/17/2025 Negative  Negative Final    MDMA (ECSTASY) 07/17/2025 Negative  Negative Final    Methamphetamine, Ur 07/17/2025 Negative  Negative Final    Methadone Screen, Urine 07/17/2025 Negative  Negative Final    Morphine/Opiates Screen, Urine 07/17/2025 Negative  Negative Final    Oxycodone Screen, Urine 07/17/2025 Negative  Negative Final    Phencyclidine (PCP), Urine 07/17/2025 Negative  Negative Final    Propoxyphene Scree, Urine 07/17/2025 Negative  Negative Final    Tricyclic Antidepressants Screen 07/17/2025 Negative  Negative Final    THC, Screen, Urine 07/17/2025 Negative  Negative Final    AMPHETAMINE 07/14/2025 Not Detected  500 ng/mL ng/mL Final    METHAMPHETAMINE 07/14/2025 Not Detected  500 ng/mL ng/mL Final    FENTANYL 07/14/2025 Not Detected  1 ng/mL ng/mL Final    Barbiturates 07/14/2025 Not Detected  200 ng/mL ng/mL Final    Benzodiazepines 07/14/2025 Not Detected  200 ng/mL ng/mL Final    BUPRENORPHINE 07/14/2025 Not Detected  5.0 ng/mL ng/mL Final    ALCOHOL, ETHYL 07/14/2025 Not Detected  10 mg/dL mg/dL Final    EDDP 07/14/2025 Not Detected  100 ng/mL ng/mL Final     Opiates 07/14/2025 Not Detected  300 ng/mL ng/mL Final    6-ACETYLMORPHINE 07/14/2025 Not Detected  10 ng/mL ng/mL Final    OXYCODONE 07/14/2025 Not Detected  100 ng/mL ng/mL Final    PCP 07/14/2025 Not Detected  25 ng/mL ng/mL Final    THC 07/14/2025 Not Detected  50 ng/mL ng/mL Final    Cocaine Metabolite 07/14/2025 Not Detected  150 ng/mL ng/mL Final    CREATININE 07/14/2025 33.5  >20.0 mg/dL mg/dL Final    PH 07/14/2025 6.0  4.5 - 9.0 Final    OXIDANTS 07/14/2025 Not Detected  0-200 ug/mL ug/mL Final    Specific Gravity, Celestino 07/14/2025 1.007  1.003 - 1.035 Final    Reference Lab Report 07/14/2025    Final    See Full Screen for results.       Assessment & Plan   Diagnoses and all orders for this visit:    1. Alcohol use disorder, severe, dependence (Primary)    2. Anxiety disorder, unspecified type    Other orders  -     hydrOXYzine pamoate (Vistaril) 25 MG capsule; Take 1 capsule by mouth 3 (Three) Times a Day As Needed for Anxiety for up to 30 days.  Dispense: 90 capsule; Refill: 0      Return in about 2 weeks (around 8/7/2025).

## 2025-07-28 ENCOUNTER — CLINICAL SUPPORT (OUTPATIENT)
Dept: PSYCHIATRY | Facility: CLINIC | Age: 40
End: 2025-07-28
Payer: COMMERCIAL

## 2025-07-28 ENCOUNTER — OFFICE VISIT (OUTPATIENT)
Dept: PSYCHIATRY | Facility: HOSPITAL | Age: 40
End: 2025-07-28
Payer: COMMERCIAL

## 2025-07-28 DIAGNOSIS — Z79.899 MEDICATION MANAGEMENT: Primary | ICD-10-CM

## 2025-07-28 DIAGNOSIS — F10.20 ALCOHOL USE DISORDER, SEVERE, DEPENDENCE: Primary | ICD-10-CM

## 2025-07-28 DIAGNOSIS — F41.9 ANXIETY DISORDER, UNSPECIFIED TYPE: ICD-10-CM

## 2025-07-28 PROCEDURE — H0015 ALCOHOL AND/OR DRUG SERVICES: HCPCS

## 2025-07-28 PROCEDURE — S9480 INTENSIVE OUTPATIENT PSYCHIA: HCPCS

## 2025-07-28 NOTE — PROGRESS NOTES
"NAME: Khai Perkins  DATE: 07/24/2025    [facility] Phase I  [id number]    Services Provided    Group Psychotherapy x 1  Education/Training x 3  Activity Therapy  x 0  Individual Therapy x 0 0 minutes  Family Therapy x 0  MD Initial Visit  x 0  MD Follow-Up   x 1    Number of participants: 3    DATA:  Patient shared that his brother was discharged from [facility] yesterday. The doctors are still unsure what is wrong with him. Patient shared that his brother is barely ambulating with a walker. He shared that the situation has been very stressful. Patient reports having a lapse with alcohol on Monday. He drank 8 beers and a pint of bourbon     Individual: No    Homework: None assigned    Group 1 (Psychotherapy: Check-ins): Therapist continued facilitation of rapport building strategies between group members. Therapist asked that each patient check in with home life and recovery efforts and identify triggers, cravings, and high risk situations that arise between group sessions.  Group members discussed barriers and benefits of attending recovery meetings.  Therapist provided empathy and support during group session. Daily Reading: None    Group 2  (Relapse Prevention) Therapist facilitated a group therapy session focused on relapse prevention. Group members identified high risk situations for relapse and developed an action plan.     Group 3 (Psychoeducation) Therapist facilitated a group therapy session focused on boredom. Group members watched \"Why do we get Bored?\" By MOISÉS Clay. They discussed the content of the video and how boredom affects their sobriety.     Group 4 (Psychoeducation) Group members received psychoeducation from Lily Sommers, .      ASSESSMENT:    Personal Assessment 0-10 Scale (10 worst)    Anxiety:  8   Depression:  8   Cravings: 4      MSE within normal limits? Yes Affect: somber Mood: depressed and anxious  Pt Response:  open/receptive  Engaged in activity/Process and " self-disclosed: moderate  Applies today's group topics to self: moderate  Able to give and receive feedback: moderate  Demonstrated insightful thinking: consistent and moderate  Other pt response comments:  Patient was a positive participant. They demonstrated a relatively positive attitude, a moderate degree of motivation, and moderate insight, as evidenced by insights shared combined with level of engagement. They seemed interested. They appeared to comprehend well the concepts being discussed. The patient seemed receptive of, and somewhat willing to implement, the ideas being discussed. Their thought process appeared linear and goal directed, and their speech was regular rate and rhythm.       Community Group Engagement:  No:      Reported relapse since last meeting? Yes: Patient had a lapse on Monday. He reports the lapse was triggered by stress related to his brother's illness.     ASAM Dimensions:  I.    Intoxication/Withdrawal:  1    II.   Medical Conditions/Complications:  0   III.  Behavioral/Emotional/Cognitive: 1   IV.  Readiness to Change: 2   V.   Relapse Risk: 2   VI:  Recovery Environment: 1   Total ASAM Score = 7      .  Office Visit on 07/21/2025   Component Date Value Ref Range Status    Amphetamine Screen, Urine 07/22/2025 Negative  Negative Final    Refer to confirmation by [facility] in scanned documents    Barbiturates Screen, Urine 07/22/2025 Negative  Negative Final    Buprenorphine, Screen, Urine 07/22/2025 Negative  Negative Final    Benzodiazepine Screen, Urine 07/22/2025 Negative  Negative Final    Cocaine Screen, Urine 07/22/2025 Negative  Negative Final    MDMA (ECSTASY) 07/22/2025 Negative  Negative Final    Methamphetamine, Ur 07/22/2025 Negative  Negative Final    Methadone Screen, Urine 07/22/2025 Negative  Negative Final    Morphine/Opiates Screen, Urine 07/22/2025 Negative  Negative Final    Oxycodone Screen, Urine 07/22/2025 Negative  Negative Final    Phencyclidine (PCP), Urine  07/22/2025 Negative  Negative Final    Propoxyphene Scree, Urine 07/22/2025 Negative  Negative Final    Tricyclic Antidepressants Screen 07/22/2025 Negative  Negative Final    THC, Screen, Urine 07/22/2025 Negative  Negative Final    AMPHETAMINE 07/21/2025 Not Detected  500 ng/mL ng/mL Final    METHAMPHETAMINE 07/21/2025 Detected  500 ng/mL ng/mL Final    FENTANYL 07/21/2025 Not Detected  1 ng/mL ng/mL Final    Barbiturates 07/21/2025 Not Detected  200 ng/mL ng/mL Final    Benzodiazepines 07/21/2025 Not Detected  200 ng/mL ng/mL Final    BUPRENORPHINE 07/21/2025 Not Detected  5.0 ng/mL ng/mL Final    ALCOHOL, ETHYL 07/21/2025 Not Detected  10 mg/dL mg/dL Final    EDDP 07/21/2025 Not Detected  100 ng/mL ng/mL Final    Opiates 07/21/2025 Not Detected  300 ng/mL ng/mL Final    6-ACETYLMORPHINE 07/21/2025 Not Detected  10 ng/mL ng/mL Final    OXYCODONE 07/21/2025 Not Detected  100 ng/mL ng/mL Final    PCP 07/21/2025 Not Detected  25 ng/mL ng/mL Final    THC 07/21/2025 Not Detected  50 ng/mL ng/mL Final    Cocaine Metabolite 07/21/2025 Not Detected  150 ng/mL ng/mL Final    CREATININE 07/21/2025 78.4  >20.0 mg/dL mg/dL Final    PH 07/21/2025 6.2  4.5 - 9.0 Final    OXIDANTS 07/21/2025 Not Detected  0-200 ug/mL ug/mL Final    Specific Gravity, Celestino 07/21/2025 1.010  1.003 - 1.035 Final    Reference Lab Report 07/21/2025    Final    See Full Screen for results.    METHAMPHETAMINE 07/21/2025 733  100 ng/mL ng/mL Final   Office Visit on 07/14/2025   Component Date Value Ref Range Status    Amphetamine Screen, Urine 07/17/2025 Negative  Negative Final    Refer to confirmation by [facility] in scanned documents    Barbiturates Screen, Urine 07/17/2025 Negative  Negative Final    Buprenorphine, Screen, Urine 07/17/2025 Negative  Negative Final    Benzodiazepine Screen, Urine 07/17/2025 Negative  Negative Final    Cocaine Screen, Urine 07/17/2025 Negative  Negative Final    MDMA (ECSTASY) 07/17/2025 Negative  Negative Final     Methamphetamine, Ur 07/17/2025 Negative  Negative Final    Methadone Screen, Urine 07/17/2025 Negative  Negative Final    Morphine/Opiates Screen, Urine 07/17/2025 Negative  Negative Final    Oxycodone Screen, Urine 07/17/2025 Negative  Negative Final    Phencyclidine (PCP), Urine 07/17/2025 Negative  Negative Final    Propoxyphene Scree, Urine 07/17/2025 Negative  Negative Final    Tricyclic Antidepressants Screen 07/17/2025 Negative  Negative Final    THC, Screen, Urine 07/17/2025 Negative  Negative Final    AMPHETAMINE 07/14/2025 Not Detected  500 ng/mL ng/mL Final    METHAMPHETAMINE 07/14/2025 Not Detected  500 ng/mL ng/mL Final    FENTANYL 07/14/2025 Not Detected  1 ng/mL ng/mL Final    Barbiturates 07/14/2025 Not Detected  200 ng/mL ng/mL Final    Benzodiazepines 07/14/2025 Not Detected  200 ng/mL ng/mL Final    BUPRENORPHINE 07/14/2025 Not Detected  5.0 ng/mL ng/mL Final    ALCOHOL, ETHYL 07/14/2025 Not Detected  10 mg/dL mg/dL Final    EDDP 07/14/2025 Not Detected  100 ng/mL ng/mL Final    Opiates 07/14/2025 Not Detected  300 ng/mL ng/mL Final    6-ACETYLMORPHINE 07/14/2025 Not Detected  10 ng/mL ng/mL Final    OXYCODONE 07/14/2025 Not Detected  100 ng/mL ng/mL Final    PCP 07/14/2025 Not Detected  25 ng/mL ng/mL Final    THC 07/14/2025 Not Detected  50 ng/mL ng/mL Final    Cocaine Metabolite 07/14/2025 Not Detected  150 ng/mL ng/mL Final    CREATININE 07/14/2025 33.5  >20.0 mg/dL mg/dL Final    PH 07/14/2025 6.0  4.5 - 9.0 Final    OXIDANTS 07/14/2025 Not Detected  0-200 ug/mL ug/mL Final    Specific Gravity, Celestino 07/14/2025 1.007  1.003 - 1.035 Final    Reference Lab Report 07/14/2025    Final    See Full Screen for results.   Office Visit on 07/09/2025   Component Date Value Ref Range Status    Amphetamine Screen, Urine 07/09/2025 Negative  Negative Final    Refer to confirmation by [facility] in scanned documents    Barbiturates Screen, Urine 07/09/2025 Negative  Negative Final    Buprenorphine, Screen,  Urine 07/09/2025 Negative  Negative Final    Benzodiazepine Screen, Urine 07/09/2025 Negative  Negative Final    Cocaine Screen, Urine 07/09/2025 Negative  Negative Final    MDMA (ECSTASY) 07/09/2025 Negative  Negative Final    Methamphetamine, Ur 07/09/2025 Negative  Negative Final    Methadone Screen, Urine 07/09/2025 Negative  Negative Final    Morphine/Opiates Screen, Urine 07/09/2025 Negative  Negative Final    Oxycodone Screen, Urine 07/09/2025 Negative  Negative Final    Phencyclidine (PCP), Urine 07/09/2025 Negative  Negative Final    Propoxyphene Scree, Urine 07/09/2025 Negative  Negative Final    Tricyclic Antidepressants Screen 07/09/2025 Negative  Negative Final    THC, Screen, Urine 07/09/2025 Negative  Negative Final    AMPHETAMINE 07/09/2025 Not Detected  500 ng/mL ng/mL Final    METHAMPHETAMINE 07/09/2025 Not Detected  500 ng/mL ng/mL Final    FENTANYL 07/09/2025 Not Detected  1 ng/mL ng/mL Final    Barbiturates 07/09/2025 Not Detected  200 ng/mL ng/mL Final    Benzodiazepines 07/09/2025 Not Detected  200 ng/mL ng/mL Final    BUPRENORPHINE 07/09/2025 Not Detected  5.0 ng/mL ng/mL Final    ALCOHOL, ETHYL 07/09/2025 Not Detected  10 mg/dL mg/dL Final    EDDP 07/09/2025 Not Detected  100 ng/mL ng/mL Final    Opiates 07/09/2025 Not Detected  300 ng/mL ng/mL Final    6-ACETYLMORPHINE 07/09/2025 Not Detected  10 ng/mL ng/mL Final    OXYCODONE 07/09/2025 Not Detected  100 ng/mL ng/mL Final    PCP 07/09/2025 Not Detected  25 ng/mL ng/mL Final    THC 07/09/2025 Detected  50 ng/mL ng/mL Final    Cocaine Metabolite 07/09/2025 Not Detected  150 ng/mL ng/mL Final    CREATININE 07/09/2025 167.2  >20.0 mg/dL mg/dL Final    PH 07/09/2025 6.1  4.5 - 9.0 Final    OXIDANTS 07/09/2025 Not Detected  0-200 ug/mL ug/mL Final    Specific Gravity, Celestino 07/09/2025 1.013  1.003 - 1.035 Final    Reference Lab Report 07/09/2025    Final    See Full Screen for results.    9-DELTA-[id number]-[id number] 07/09/2025 Not Detected  25 ng/mL ng/mL  Final       Impression/Formulation:    ICD-10-CM ICD-9-CM   1. Alcohol use disorder, severe, dependence  F10.20 303.90   2. Anxiety disorder, unspecified type  F41.9 300.00        CLINICAL MANEUVERING/INTERVENTIONS: Therapist utilized a person-centered approach to build and maintain rapport with group member.   Therapist promoted safe nonjudgmental environment by providing group members with unconditional positive regard.  Assisted member in processing above session content; acknowledged and normalized patient's thoughts, feelings and concerns.  Allowed patient to freely discuss issues without interruption or judgment. Provided safe, confidential environment to facilitate the development of positive therapeutic relationship and encourage open, honest communication. Therapist assisted group member with identifying and implementing healthier coping strategies and maintaining healthier boundaries. Assisted patient in identifying risk factors which would indicate the need for higher level of care including thoughts to harm self or others and/or self-harming behavior and encouraged patient to contact this office, call 911, or present to the nearest emergency room should any of these events occur. Pt agreeable to adhere to medication regimen as prescribed and report any side effects.  Discussed crisis intervention services and means to access.  Patient adamantly and convincingly denies current suicidal or homicidal ideation or perceptual disturbance.      Therapist implemented motivational interviewing techniques to assist client with exploring and resolving ambivalence associated with commitment to change behaviors.  Yes  Therapist utilized dialectical behavior techniques to teach and model emotional regulation and relaxation.  No   Therapist applied cognitive behavioral strategies to facilitate identification of maladaptive patterns of thinking and behavior.  Yes     PLAN:    [facility] IOP Phase  I  Aftercare:  [facility] CD Outpatient Phase 2      Please note that portions of this note were completed with a voice recognition program. Efforts were made to edit dictation, but occasionally words are mistranscribed.     This document signed by Freda Good, July 28, 2025, 08:14 EDT

## 2025-07-29 ENCOUNTER — OFFICE VISIT (OUTPATIENT)
Dept: PSYCHIATRY | Facility: HOSPITAL | Age: 40
End: 2025-07-29
Payer: COMMERCIAL

## 2025-07-29 DIAGNOSIS — F41.9 ANXIETY DISORDER, UNSPECIFIED TYPE: ICD-10-CM

## 2025-07-29 DIAGNOSIS — F10.20 ALCOHOL USE DISORDER, SEVERE, DEPENDENCE: Primary | ICD-10-CM

## 2025-07-29 LAB
6-ACETYLMORPHINE: NOT DETECTED NG/ML
ALCOHOL, ETHYL: NOT DETECTED MG/DL
AMPHETAMINE: NOT DETECTED NG/ML
BENZODIAZ BLD QL: NOT DETECTED NG/ML
BUPRENORPHINE SAL CFM-MCNC: NOT DETECTED NG/ML
COCAINE METABOLITE: NOT DETECTED NG/ML
CREATININE: 21.8 MG/DL
EDDP SERPL QL: NOT DETECTED NG/ML
FENTANYL-EIA: NOT DETECTED NG/ML
METHAMPHETAMINE: NOT DETECTED NG/ML
OPIATES: NOT DETECTED NG/ML
OXIDANTS: NOT DETECTED UG/ML
OXYCODONE: NOT DETECTED NG/ML
PCP: NOT DETECTED NG/ML
PH: 6.3 (ref 4.5–9)
SPECIFIC GRAVITY, QUAN: 1 (ref 1–1.03)
THC: NOT DETECTED NG/ML

## 2025-07-29 PROCEDURE — S9480 INTENSIVE OUTPATIENT PSYCHIA: HCPCS

## 2025-07-29 PROCEDURE — H0015 ALCOHOL AND/OR DRUG SERVICES: HCPCS

## 2025-07-29 NOTE — PROGRESS NOTES
"NAME: Khai Perkins  DATE: 07/28/2025    [facility] Phase I  [id number]    Services Provided    Group Psychotherapy x 1  Education/Training x 2  Activity Therapy  x 0  Individual Therapy x 0 0 minutes  Family Therapy x 0  MD Initial Visit  x 0  MD Follow-Up   x 0    Number of participants: 3    DATA:  Patient shared that he is doing fair overall. His brother is still sick and doctors are unsure what is wrong with him. Patient reports that he's not been sleeping well. He has not tried his new anxiety medication yet. Patient reports being hesitant to take medications. He reports feeling groggy. 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     Individual: No    Homework: None assigned    Group 1 (Psychotherapy: Check-ins): Therapist continued facilitation of rapport building strategies between group members. Therapist asked that each patient check in with home life and recovery efforts and identify triggers, cravings, and high risk situations that arise between group sessions.  Group members discussed barriers and benefits of attending recovery meetings.  Therapist provided empathy and support during group session. Daily Reading: None      Group 2  (Psychoeducation) Therapist facilitated group therapy session focused on managing down time in recovery. Group members completed \"[place] to Look Forward to: Managing Down Time\" worksheet. Group members discussed managing and coping with feelings of boredom.      Group 3 (Psychoeducation) Group members received education from Lily Sommers, peer support.        ASSESSMENT:    Personal Assessment 0-10 Scale (10 worst)    Anxiety:  4   Depression:  4   Cravings: 2      MSE within normal limits? Yes Affect: somber Mood: anxious  Pt Response:  open/receptive  Engaged in activity/Process and self-disclosed: moderate  Applies today's group topics to self: moderate  Able to give and receive feedback: moderate  Demonstrated insightful thinking: consistent and " moderate  Other pt response comments:  Patient was a positive participant. They demonstrated a relatively positive attitude, a moderate degree of motivation, and moderate insight, as evidenced by level of engagement combined with insights shared. They seemed interested and attentive. They appeared to comprehend well the concepts being discussed. The patient seemed receptive of, and willing to implement, the ideas being discussed. Their thought process appeared linear and goal directed, and their speech was regular rate and rhythm.       Community Group Engagement:  No:      Reported relapse since last meeting? No:      ASAM Dimensions:  I.    Intoxication/Withdrawal:  0    II.   Medical Conditions/Complications:  0   III.  Behavioral/Emotional/Cognitive: 1  IV.  Readiness to Change: 2   V.   Relapse Risk: 2   VI:  Recovery Environment: 1   Total ASAM Score = 6      .  Clinical Support on 07/28/2025   Component Date Value Ref Range Status    Amphetamine Screen, Urine 07/28/2025 Negative  Negative Final    Refer to confirmation by [employer] in scanned documents    Barbiturates Screen, Urine 07/28/2025 Negative  Negative Final    Buprenorphine, Screen, Urine 07/28/2025 Negative  Negative Final    Benzodiazepine Screen, Urine 07/28/2025 Negative  Negative Final    Cocaine Screen, Urine 07/28/2025 Negative  Negative Final    MDMA (ECSTASY) 07/28/2025 Negative  Negative Final    Methamphetamine, Ur 07/28/2025 Negative  Negative Final    Methadone Screen, Urine 07/28/2025 Negative  Negative Final    Morphine/Opiates Screen, Urine 07/28/2025 Negative  Negative Final    Oxycodone Screen, Urine 07/28/2025 Negative  Negative Final    Phencyclidine (PCP), Urine 07/28/2025 Negative  Negative Final    Propoxyphene Scree, Urine 07/28/2025 Negative  Negative Final    Tricyclic Antidepressants Screen 07/28/2025 Negative  Negative Final    THC, Screen, Urine 07/28/2025 Negative  Negative Final   Office Visit on 07/21/2025   Component  Date Value Ref Range Status    Amphetamine Screen, Urine 07/22/2025 Negative  Negative Final    Refer to confirmation by [facility] in scanned documents    Barbiturates Screen, Urine 07/22/2025 Negative  Negative Final    Buprenorphine, Screen, Urine 07/22/2025 Negative  Negative Final    Benzodiazepine Screen, Urine 07/22/2025 Negative  Negative Final    Cocaine Screen, Urine 07/22/2025 Negative  Negative Final    MDMA (ECSTASY) 07/22/2025 Negative  Negative Final    Methamphetamine, Ur 07/22/2025 Negative  Negative Final    Methadone Screen, Urine 07/22/2025 Negative  Negative Final    Morphine/Opiates Screen, Urine 07/22/2025 Negative  Negative Final    Oxycodone Screen, Urine 07/22/2025 Negative  Negative Final    Phencyclidine (PCP), Urine 07/22/2025 Negative  Negative Final    Propoxyphene Scree, Urine 07/22/2025 Negative  Negative Final    Tricyclic Antidepressants Screen 07/22/2025 Negative  Negative Final    THC, Screen, Urine 07/22/2025 Negative  Negative Final    AMPHETAMINE 07/21/2025 Not Detected  500 ng/mL ng/mL Final    METHAMPHETAMINE 07/21/2025 Detected  500 ng/mL ng/mL Final    FENTANYL 07/21/2025 Not Detected  1 ng/mL ng/mL Final    Barbiturates 07/21/2025 Not Detected  200 ng/mL ng/mL Final    Benzodiazepines 07/21/2025 Not Detected  200 ng/mL ng/mL Final    BUPRENORPHINE 07/21/2025 Not Detected  5.0 ng/mL ng/mL Final    ALCOHOL, ETHYL 07/21/2025 Not Detected  10 mg/dL mg/dL Final    EDDP 07/21/2025 Not Detected  100 ng/mL ng/mL Final    Opiates 07/21/2025 Not Detected  300 ng/mL ng/mL Final    6-ACETYLMORPHINE 07/21/2025 Not Detected  10 ng/mL ng/mL Final    OXYCODONE 07/21/2025 Not Detected  100 ng/mL ng/mL Final    PCP 07/21/2025 Not Detected  25 ng/mL ng/mL Final    THC 07/21/2025 Not Detected  50 ng/mL ng/mL Final    Cocaine Metabolite 07/21/2025 Not Detected  150 ng/mL ng/mL Final    CREATININE 07/21/2025 78.4  >20.0 mg/dL mg/dL Final    PH 07/21/2025 6.2  4.5 - 9.0 Final    OXIDANTS  07/21/2025 Not Detected  0-200 ug/mL ug/mL Final    Specific Gravity, [place] 07/21/2025 1.010  1.003 - 1.035 Final    Reference Lab Report 07/21/2025    Final    See Full Screen for results.    METHAMPHETAMINE 07/21/2025 733  100 ng/mL ng/mL Final   Office Visit on 07/14/2025   Component Date Value Ref Range Status    Amphetamine Screen, Urine 07/17/2025 Negative  Negative Final    Refer to confirmation by [facility] [employer] in scanned documents    Barbiturates Screen, Urine 07/17/2025 Negative  Negative Final    Buprenorphine, Screen, Urine 07/17/2025 Negative  Negative Final    Benzodiazepine Screen, Urine 07/17/2025 Negative  Negative Final    Cocaine Screen, Urine 07/17/2025 Negative  Negative Final    MDMA (ECSTASY) 07/17/2025 Negative  Negative Final    Methamphetamine, Ur 07/17/2025 Negative  Negative Final    Methadone Screen, Urine 07/17/2025 Negative  Negative Final    Morphine/Opiates Screen, Urine 07/17/2025 Negative  Negative Final    Oxycodone Screen, Urine 07/17/2025 Negative  Negative Final    Phencyclidine (PCP), Urine 07/17/2025 Negative  Negative Final    Propoxyphene Scree, Urine 07/17/2025 Negative  Negative Final    Tricyclic Antidepressants Screen 07/17/2025 Negative  Negative Final    THC, Screen, Urine 07/17/2025 Negative  Negative Final    AMPHETAMINE 07/14/2025 Not Detected  500 ng/mL ng/mL Final    METHAMPHETAMINE 07/14/2025 Not Detected  500 ng/mL ng/mL Final    FENTANYL 07/14/2025 Not Detected  1 ng/mL ng/mL Final    Barbiturates 07/14/2025 Not Detected  200 ng/mL ng/mL Final    Benzodiazepines 07/14/2025 Not Detected  200 ng/mL ng/mL Final    BUPRENORPHINE 07/14/2025 Not Detected  5.0 ng/mL ng/mL Final    ALCOHOL, ETHYL 07/14/2025 Not Detected  10 mg/dL mg/dL Final    EDDP 07/14/2025 Not Detected  100 ng/mL ng/mL Final    Opiates 07/14/2025 Not Detected  300 ng/mL ng/mL Final    6-ACETYLMORPHINE 07/14/2025 Not Detected  10 ng/mL ng/mL Final    OXYCODONE 07/14/2025 Not Detected  100 ng/mL ng/mL  Final    PCP 07/14/2025 Not Detected  25 ng/mL ng/mL Final    THC 07/14/2025 Not Detected  50 ng/mL ng/mL Final    Cocaine Metabolite 07/14/2025 Not Detected  150 ng/mL ng/mL Final    CREATININE 07/14/2025 33.5  >20.0 mg/dL mg/dL Final    PH 07/14/2025 6.0  4.5 - 9.0 Final    OXIDANTS 07/14/2025 Not Detected  0-200 ug/mL ug/mL Final    Specific Gravity, [place] 07/14/2025 1.007  1.003 - 1.035 Final    Reference Lab Report 07/14/2025    Final    See Full Screen for results.   Office Visit on 07/09/2025   Component Date Value Ref Range Status    Amphetamine Screen, Urine 07/09/2025 Negative  Negative Final    Refer to confirmation by [facility] in scanned documents    Barbiturates Screen, Urine 07/09/2025 Negative  Negative Final    Buprenorphine, Screen, Urine 07/09/2025 Negative  Negative Final    Benzodiazepine Screen, Urine 07/09/2025 Negative  Negative Final    Cocaine Screen, Urine 07/09/2025 Negative  Negative Final    MDMA (ECSTASY) 07/09/2025 Negative  Negative Final    Methamphetamine, Ur 07/09/2025 Negative  Negative Final    Methadone Screen, Urine 07/09/2025 Negative  Negative Final    Morphine/Opiates Screen, Urine 07/09/2025 Negative  Negative Final    Oxycodone Screen, Urine 07/09/2025 Negative  Negative Final    Phencyclidine (PCP), Urine 07/09/2025 Negative  Negative Final    Propoxyphene Scree, Urine 07/09/2025 Negative  Negative Final    Tricyclic Antidepressants Screen 07/09/2025 Negative  Negative Final    THC, Screen, Urine 07/09/2025 Negative  Negative Final    AMPHETAMINE 07/09/2025 Not Detected  500 ng/mL ng/mL Final    METHAMPHETAMINE 07/09/2025 Not Detected  500 ng/mL ng/mL Final    FENTANYL 07/09/2025 Not Detected  1 ng/mL ng/mL Final    Barbiturates 07/09/2025 Not Detected  200 ng/mL ng/mL Final    Benzodiazepines 07/09/2025 Not Detected  200 ng/mL ng/mL Final    BUPRENORPHINE 07/09/2025 Not Detected  5.0 ng/mL ng/mL Final    ALCOHOL, ETHYL 07/09/2025 Not Detected  10 mg/dL mg/dL Final     EDDP 07/09/2025 Not Detected  100 ng/mL ng/mL Final    Opiates 07/09/2025 Not Detected  300 ng/mL ng/mL Final    6-ACETYLMORPHINE 07/09/2025 Not Detected  10 ng/mL ng/mL Final    OXYCODONE 07/09/2025 Not Detected  100 ng/mL ng/mL Final    PCP 07/09/2025 Not Detected  25 ng/mL ng/mL Final    THC 07/09/2025 Detected  50 ng/mL ng/mL Final    Cocaine Metabolite 07/09/2025 Not Detected  150 ng/mL ng/mL Final    CREATININE 07/09/2025 167.2  >20.0 mg/dL mg/dL Final    PH 07/09/2025 6.1  4.5 - 9.0 Final    OXIDANTS 07/09/2025 Not Detected  0-200 ug/mL ug/mL Final    Specific Gravity, [place] 07/09/2025 1.013  1.003 - 1.035 Final    Reference Lab Report 07/09/2025    Final    See Full Screen for results.    9-DELTA-THC-COOH 07/09/2025 Not Detected  25 ng/mL ng/mL Final       Impression/Formulation:    ICD-10-CM ICD-9-CM   1. Alcohol use disorder, severe, dependence  F10.20 303.90   2. Anxiety disorder, unspecified type  F41.9 300.00        CLINICAL MANEUVERING/INTERVENTIONS: Therapist utilized a person-centered approach to build and maintain rapport with group member.   Therapist promoted safe nonjudgmental environment by providing group members with unconditional positive regard.  Assisted member in processing above session content; acknowledged and normalized patient's thoughts, feelings and concerns.  Allowed patient to freely discuss issues without interruption or judgment. Provided safe, confidential environment to facilitate the development of positive therapeutic relationship and encourage open, honest communication. Therapist assisted group member with identifying and implementing healthier coping strategies and maintaining healthier boundaries. Assisted patient in identifying risk factors which would indicate the need for higher level of care including thoughts to harm self or others and/or self-harming behavior and encouraged patient to contact this office, call 911, or present to the nearest emergency room should any  of these events occur. Pt agreeable to adhere to medication regimen as prescribed and report any side effects.  Discussed crisis intervention services and means to access.  Patient adamantly and convincingly denies current suicidal or homicidal ideation or perceptual disturbance.      Therapist implemented motivational interviewing techniques to assist client with exploring and resolving ambivalence associated with commitment to change behaviors.  Yes  Therapist utilized dialectical behavior techniques to teach and model emotional regulation and relaxation.  No   Therapist applied cognitive behavioral strategies to facilitate identification of maladaptive patterns of thinking and behavior.  Yes     PLAN:    [facility]  IOP Phase I  Aftercare:  [facility]  Outpatient Phase 2      Please note that portions of this note were completed with a voice recognition program. Efforts were made to edit dictation, but occasionally words are mistranscribed.     This document signed by Freda Good, July 29, 2025, 11:16 EDT

## 2025-07-30 ENCOUNTER — APPOINTMENT (OUTPATIENT)
Dept: PSYCHIATRY | Facility: HOSPITAL | Age: 40
End: 2025-07-30
Payer: COMMERCIAL

## 2025-07-30 LAB — REF LAB TEST METHOD: NORMAL
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
## 2025-07-31 ENCOUNTER — OFFICE VISIT (OUTPATIENT)
Dept: PSYCHIATRY | Facility: HOSPITAL | Age: 40
End: 2025-07-31
Payer: COMMERCIAL

## 2025-07-31 DIAGNOSIS — F41.9 ANXIETY DISORDER, UNSPECIFIED TYPE: ICD-10-CM

## 2025-07-31 DIAGNOSIS — F10.20 ALCOHOL USE DISORDER, SEVERE, DEPENDENCE: Primary | ICD-10-CM

## 2025-07-31 PROCEDURE — S9480 INTENSIVE OUTPATIENT PSYCHIA: HCPCS

## 2025-07-31 PROCEDURE — H0015 ALCOHOL AND/OR DRUG SERVICES: HCPCS

## 2025-08-04 ENCOUNTER — OFFICE VISIT (OUTPATIENT)
Dept: PSYCHIATRY | Facility: HOSPITAL | Age: 40
End: 2025-08-04
Payer: COMMERCIAL

## 2025-08-04 DIAGNOSIS — Z79.899 MEDICATION MANAGEMENT: ICD-10-CM

## 2025-08-04 DIAGNOSIS — F10.20 ALCOHOL USE DISORDER, SEVERE, DEPENDENCE: Primary | ICD-10-CM

## 2025-08-04 DIAGNOSIS — F41.9 ANXIETY DISORDER, UNSPECIFIED TYPE: ICD-10-CM

## 2025-08-04 PROCEDURE — H0015 ALCOHOL AND/OR DRUG SERVICES: HCPCS

## 2025-08-04 NOTE — PROGRESS NOTES
"NAME: Khai Perkins  DATE: 07/31/2025    [facility] Phase I  [id number]    Services Provided    Group Psychotherapy x 1  Education/Training x 2  Activity Therapy  x 0  Individual Therapy x 0 0 minutes  Family Therapy x 0  MD Initial Visit  x 0  MD Follow-Up   x 0    Number of participants: 3    DATA:  Patient shared that he is \"hanging in there\" today. He had to take his brother to an appointment in [place] yesterday. Patient shared that he slept a little better last night. He is stressed with work.     Individual: No    Homework: None assigned    Group 1 (Psychotherapy: Check-ins): Therapist continued facilitation of rapport building strategies between group members. Therapist asked that each patient check in with home life and recovery efforts and identify triggers, cravings, and high risk situations that arise between group sessions.  Group members discussed barriers and benefits of attending recovery meetings.  Therapist provided empathy and support during group session. Daily Reading: None    Group 2  (Psychoeducation) Therapist facilitated group therapy session focused on \"Relapse Prevention and the Five Rules of Recovery\" packet. Group members continued discussing the stages of relapse and developed action plans for each stage. Group members reviewed common cognitive distortions that affect recovery. Group members also reviewed stages of recovery.     Group 3 (Psychoeducation) Group members received education and peer support from Lily Sommers.      ASSESSMENT:    Personal Assessment 0-10 Scale (10 worst)    Anxiety:  2   Depression:  3   Cravings: 1      MSE within normal limits? Yes Affect: somber Mood: depressed  Pt Response:  open/receptive  Engaged in activity/Process and self-disclosed: moderate  Applies today's group topics to self: moderate  Able to give and receive feedback: moderate  Demonstrated insightful thinking: consistent and moderate  Other pt response comments:  Patient was a positive " participant. They demonstrated a relatively positive attitude, a moderate degree of motivation, and moderate insight, as evidenced by level of engagement combined with insights. They seemed interested and attentive. They appeared to comprehend well the concepts being discussed. The patient seemed receptive of, and willing to implement, the ideas being discussed. Their thought process appeared linear and goal directed, and their speech was regular rate and rhythm.       Community Group Engagement:  No:      Reported relapse since last meeting? No:      ASAM Dimensions:  I.    Intoxication/Withdrawal:  0    II.   Medical Conditions/Complications:  0   III.  Behavioral/Emotional/Cognitive: 1   IV.  Readiness to Change: 2   V.   Relapse Risk: 2  VI:  Recovery Environment: 1   Total ASAM Score = 6      .  Clinical Support on 07/28/2025   Component Date Value Ref Range Status    Amphetamine Screen, Urine 07/28/2025 Negative  Negative Final    Refer to confirmation by [employer] in scanned documents    Barbiturates Screen, Urine 07/28/2025 Negative  Negative Final    Buprenorphine, Screen, Urine 07/28/2025 Negative  Negative Final    Benzodiazepine Screen, Urine 07/28/2025 Negative  Negative Final    Cocaine Screen, Urine 07/28/2025 Negative  Negative Final    MDMA (ECSTASY) 07/28/2025 Negative  Negative Final    Methamphetamine, Ur 07/28/2025 Negative  Negative Final    Methadone Screen, Urine 07/28/2025 Negative  Negative Final    Morphine/Opiates Screen, Urine 07/28/2025 Negative  Negative Final    Oxycodone Screen, Urine 07/28/2025 Negative  Negative Final    Phencyclidine (PCP), Urine 07/28/2025 Negative  Negative Final    Propoxyphene Scree, Urine 07/28/2025 Negative  Negative Final    Tricyclic Antidepressants Screen 07/28/2025 Negative  Negative Final    THC, Screen, Urine 07/28/2025 Negative  Negative Final    AMPHETAMINE 07/28/2025 Not Detected  500 ng/mL ng/mL Final    METHAMPHETAMINE 07/28/2025 Not Detected  500  ng/mL ng/mL Final    FENTANYL 07/28/2025 Not Detected  1 ng/mL ng/mL Final    Barbiturates 07/28/2025 Not Detected  200 ng/mL ng/mL Final    Benzodiazepines 07/28/2025 Not Detected  200 ng/mL ng/mL Final    BUPRENORPHINE 07/28/2025 Not Detected  5.0 ng/mL ng/mL Final    ALCOHOL, ETHYL 07/28/2025 Not Detected  10 mg/dL mg/dL Final    EDDP 07/28/2025 Not Detected  100 ng/mL ng/mL Final    Opiates 07/28/2025 Not Detected  300 ng/mL ng/mL Final    6-ACETYLMORPHINE 07/28/2025 Not Detected  10 ng/mL ng/mL Final    OXYCODONE 07/28/2025 Not Detected  100 ng/mL ng/mL Final    PCP 07/28/2025 Not Detected  25 ng/mL ng/mL Final    THC 07/28/2025 Not Detected  50 ng/mL ng/mL Final    Cocaine Metabolite 07/28/2025 Not Detected  150 ng/mL ng/mL Final    CREATININE 07/28/2025 21.8  >20.0 mg/dL mg/dL Final    PH 07/28/2025 6.3  4.5 - 9.0 Final    OXIDANTS 07/28/2025 Not Detected  0-200 ug/mL ug/mL Final    Specific Gravity, [place] 07/28/2025 1.003  1.003 - 1.035 Final    Reference Lab Report 07/28/2025    Final    See Full Screen for results.   Office Visit on 07/21/2025   Component Date Value Ref Range Status    Amphetamine Screen, Urine 07/22/2025 Negative  Negative Final    Refer to confirmation by [employer] in scanned documents    Barbiturates Screen, Urine 07/22/2025 Negative  Negative Final    Buprenorphine, Screen, Urine 07/22/2025 Negative  Negative Final    Benzodiazepine Screen, Urine 07/22/2025 Negative  Negative Final    Cocaine Screen, Urine 07/22/2025 Negative  Negative Final    MDMA (ECSTASY) 07/22/2025 Negative  Negative Final    Methamphetamine, Ur 07/22/2025 Negative  Negative Final    Methadone Screen, Urine 07/22/2025 Negative  Negative Final    Morphine/Opiates Screen, Urine 07/22/2025 Negative  Negative Final    Oxycodone Screen, Urine 07/22/2025 Negative  Negative Final    Phencyclidine (PCP), Urine 07/22/2025 Negative  Negative Final    Propoxyphene Scree, Urine 07/22/2025 Negative  Negative Final    Tricyclic  Antidepressants Screen 07/22/2025 Negative  Negative Final    THC, Screen, Urine 07/22/2025 Negative  Negative Final    AMPHETAMINE 07/21/2025 Not Detected  500 ng/mL ng/mL Final    METHAMPHETAMINE 07/21/2025 Detected  500 ng/mL ng/mL Final    FENTANYL 07/21/2025 Not Detected  1 ng/mL ng/mL Final    Barbiturates 07/21/2025 Not Detected  200 ng/mL ng/mL Final    Benzodiazepines 07/21/2025 Not Detected  200 ng/mL ng/mL Final    BUPRENORPHINE 07/21/2025 Not Detected  5.0 ng/mL ng/mL Final    ALCOHOL, ETHYL 07/21/2025 Not Detected  10 mg/dL mg/dL Final    EDDP 07/21/2025 Not Detected  100 ng/mL ng/mL Final    Opiates 07/21/2025 Not Detected  300 ng/mL ng/mL Final    6-ACETYLMORPHINE 07/21/2025 Not Detected  10 ng/mL ng/mL Final    OXYCODONE 07/21/2025 Not Detected  100 ng/mL ng/mL Final    PCP 07/21/2025 Not Detected  25 ng/mL ng/mL Final    THC 07/21/2025 Not Detected  50 ng/mL ng/mL Final    Cocaine Metabolite 07/21/2025 Not Detected  150 ng/mL ng/mL Final    CREATININE 07/21/2025 78.4  >20.0 mg/dL mg/dL Final    PH 07/21/2025 6.2  4.5 - 9.0 Final    OXIDANTS 07/21/2025 Not Detected  0-200 ug/mL ug/mL Final    Specific Gravity, [place] 07/21/2025 1.010  1.003 - 1.035 Final    Reference Lab Report 07/21/2025    Final    See Full Screen for results.    METHAMPHETAMINE 07/21/2025 733  100 ng/mL ng/mL Final   Office Visit on 07/14/2025   Component Date Value Ref Range Status    Amphetamine Screen, Urine 07/17/2025 Negative  Negative Final    Refer to confirmation by [employer] in scanned documents    Barbiturates Screen, Urine 07/17/2025 Negative  Negative Final    Buprenorphine, Screen, Urine 07/17/2025 Negative  Negative Final    Benzodiazepine Screen, Urine 07/17/2025 Negative  Negative Final    Cocaine Screen, Urine 07/17/2025 Negative  Negative Final    MDMA (ECSTASY) 07/17/2025 Negative  Negative Final    Methamphetamine, Ur 07/17/2025 Negative  Negative Final    Methadone Screen, Urine 07/17/2025 Negative  Negative  Final    Morphine/Opiates Screen, Urine 07/17/2025 Negative  Negative Final    Oxycodone Screen, Urine 07/17/2025 Negative  Negative Final    Phencyclidine (PCP), Urine 07/17/2025 Negative  Negative Final    Propoxyphene Scree, Urine 07/17/2025 Negative  Negative Final    Tricyclic Antidepressants Screen 07/17/2025 Negative  Negative Final    THC, Screen, Urine 07/17/2025 Negative  Negative Final    AMPHETAMINE 07/14/2025 Not Detected  500 ng/mL ng/mL Final    METHAMPHETAMINE 07/14/2025 Not Detected  500 ng/mL ng/mL Final    FENTANYL 07/14/2025 Not Detected  1 ng/mL ng/mL Final    Barbiturates 07/14/2025 Not Detected  200 ng/mL ng/mL Final    Benzodiazepines 07/14/2025 Not Detected  200 ng/mL ng/mL Final    BUPRENORPHINE 07/14/2025 Not Detected  5.0 ng/mL ng/mL Final    ALCOHOL, ETHYL 07/14/2025 Not Detected  10 mg/dL mg/dL Final    EDDP 07/14/2025 Not Detected  100 ng/mL ng/mL Final    Opiates 07/14/2025 Not Detected  300 ng/mL ng/mL Final    6-ACETYLMORPHINE 07/14/2025 Not Detected  10 ng/mL ng/mL Final    OXYCODONE 07/14/2025 Not Detected  100 ng/mL ng/mL Final    PCP 07/14/2025 Not Detected  25 ng/mL ng/mL Final    THC 07/14/2025 Not Detected  50 ng/mL ng/mL Final    Cocaine Metabolite 07/14/2025 Not Detected  150 ng/mL ng/mL Final    CREATININE 07/14/2025 33.5  >20.0 mg/dL mg/dL Final    PH 07/14/2025 6.0  4.5 - 9.0 Final    OXIDANTS 07/14/2025 Not Detected  0-200 ug/mL ug/mL Final    Specific Gravity, [place] 07/14/2025 1.007  1.003 - 1.035 Final    Reference Lab Report 07/14/2025    Final    See Full Screen for results.       Impression/Formulation:    ICD-10-CM ICD-9-CM   1. Alcohol use disorder, severe, dependence  F10.20 303.90   2. Anxiety disorder, unspecified type  F41.9 300.00        CLINICAL MANEUVERING/INTERVENTIONS: Therapist utilized a person-centered approach to build and maintain rapport with group member.   Therapist promoted safe nonjudgmental environment by providing group members with  unconditional positive regard.  Assisted member in processing above session content; acknowledged and normalized patient's thoughts, feelings and concerns.  Allowed patient to freely discuss issues without interruption or judgment. Provided safe, confidential environment to facilitate the development of positive therapeutic relationship and encourage open, honest communication. Therapist assisted group member with identifying and implementing healthier coping strategies and maintaining healthier boundaries. Assisted patient in identifying risk factors which would indicate the need for higher level of care including thoughts to harm self or others and/or self-harming behavior and encouraged patient to contact this office, call 911, or present to the nearest emergency room should any of these events occur. Pt agreeable to adhere to medication regimen as prescribed and report any side effects.  Discussed crisis intervention services and means to access.  Patient adamantly and convincingly denies current suicidal or homicidal ideation or perceptual disturbance.      Therapist implemented motivational interviewing techniques to assist client with exploring and resolving ambivalence associated with commitment to change behaviors.  Yes  Therapist utilized dialectical behavior techniques to teach and model emotional regulation and relaxation.  No   Therapist applied cognitive behavioral strategies to facilitate identification of maladaptive patterns of thinking and behavior.  Yes     PLAN:    Continue [facility] Phase I  Aftercare:  [facility] Phase 2      Please note that portions of this note were completed with a voice recognition program. Efforts were made to edit dictation, but occasionally words are mistranscribed.     This document signed by Freda Good, August 4, 2025, 11:44 EDT

## 2025-08-05 ENCOUNTER — OFFICE VISIT (OUTPATIENT)
Dept: PSYCHIATRY | Facility: HOSPITAL | Age: 40
End: 2025-08-05
Payer: COMMERCIAL

## 2025-08-05 DIAGNOSIS — F41.9 ANXIETY DISORDER, UNSPECIFIED TYPE: ICD-10-CM

## 2025-08-05 DIAGNOSIS — F10.20 ALCOHOL USE DISORDER, SEVERE, DEPENDENCE: Primary | ICD-10-CM

## 2025-08-05 PROCEDURE — H0015 ALCOHOL AND/OR DRUG SERVICES: HCPCS

## 2025-08-06 ENCOUNTER — OFFICE VISIT (OUTPATIENT)
Dept: PSYCHIATRY | Facility: HOSPITAL | Age: 40
End: 2025-08-06
Payer: COMMERCIAL

## 2025-08-06 DIAGNOSIS — F41.9 ANXIETY DISORDER, UNSPECIFIED TYPE: ICD-10-CM

## 2025-08-06 DIAGNOSIS — F10.20 ALCOHOL USE DISORDER, SEVERE, DEPENDENCE: Primary | ICD-10-CM

## 2025-08-06 LAB
6-ACETYLMORPHINE: NOT DETECTED NG/ML
ALCOHOL, ETHYL: NOT DETECTED MG/DL
AMPHETAMINE: NOT DETECTED NG/ML
BENZODIAZ BLD QL: NOT DETECTED NG/ML
BUPRENORPHINE SAL CFM-MCNC: NOT DETECTED NG/ML
COCAINE METABOLITE: NOT DETECTED NG/ML
CREATININE: 26.4 MG/DL
EDDP SERPL QL: NOT DETECTED NG/ML
FENTANYL-EIA: NOT DETECTED NG/ML
METHAMPHET/CREAT UR: 927 NG/ML
METHAMPHETAMINE: DETECTED NG/ML
OPIATES: NOT DETECTED NG/ML
OXIDANTS: NOT DETECTED UG/ML
OXYCODONE: NOT DETECTED NG/ML
PCP: NOT DETECTED NG/ML
PH: 5.7 (ref 4.5–9)
REF LAB TEST METHOD: NORMAL
SPECIFIC GRAVITY, QUAN: 1 (ref 1–1.03)
THC: NOT DETECTED NG/ML

## 2025-08-06 PROCEDURE — H0015 ALCOHOL AND/OR DRUG SERVICES: HCPCS

## 2025-08-07 ENCOUNTER — OFFICE VISIT (OUTPATIENT)
Dept: PSYCHIATRY | Facility: HOSPITAL | Age: 40
End: 2025-08-07
Payer: COMMERCIAL

## 2025-08-07 DIAGNOSIS — F41.9 ANXIETY DISORDER, UNSPECIFIED TYPE: ICD-10-CM

## 2025-08-07 DIAGNOSIS — F10.20 ALCOHOL USE DISORDER, SEVERE, DEPENDENCE: Primary | ICD-10-CM

## 2025-08-07 PROCEDURE — H0015 ALCOHOL AND/OR DRUG SERVICES: HCPCS

## 2025-08-11 ENCOUNTER — OFFICE VISIT (OUTPATIENT)
Dept: PSYCHIATRY | Facility: HOSPITAL | Age: 40
End: 2025-08-11
Payer: COMMERCIAL

## 2025-08-11 DIAGNOSIS — Z79.899 MEDICATION MANAGEMENT: ICD-10-CM

## 2025-08-11 DIAGNOSIS — F10.20 ALCOHOL USE DISORDER, SEVERE, DEPENDENCE: Primary | ICD-10-CM

## 2025-08-11 DIAGNOSIS — F41.9 ANXIETY DISORDER, UNSPECIFIED TYPE: ICD-10-CM

## 2025-08-11 PROCEDURE — H0015 ALCOHOL AND/OR DRUG SERVICES: HCPCS

## 2025-08-12 ENCOUNTER — OFFICE VISIT (OUTPATIENT)
Dept: PSYCHIATRY | Facility: HOSPITAL | Age: 40
End: 2025-08-12
Payer: COMMERCIAL

## 2025-08-12 DIAGNOSIS — F10.20 ALCOHOL USE DISORDER, SEVERE, DEPENDENCE: Primary | ICD-10-CM

## 2025-08-12 DIAGNOSIS — F33.0 MILD EPISODE OF RECURRENT MAJOR DEPRESSIVE DISORDER: ICD-10-CM

## 2025-08-12 DIAGNOSIS — F41.9 ANXIETY DISORDER, UNSPECIFIED TYPE: ICD-10-CM

## 2025-08-12 LAB
6-ACETYLMORPHINE: NOT DETECTED NG/ML
ALCOHOL, ETHYL: NOT DETECTED MG/DL
AMPHETAMINE: NOT DETECTED NG/ML
BENZODIAZ BLD QL: NOT DETECTED NG/ML
BUPRENORPHINE SAL CFM-MCNC: NOT DETECTED NG/ML
COCAINE METABOLITE: NOT DETECTED NG/ML
CREATININE: 33.4 MG/DL
EDDP SERPL QL: NOT DETECTED NG/ML
FENTANYL-EIA: NOT DETECTED NG/ML
METHAMPHETAMINE: NOT DETECTED NG/ML
OPIATES: NOT DETECTED NG/ML
OXIDANTS: NOT DETECTED UG/ML
OXYCODONE: NOT DETECTED NG/ML
PCP: NOT DETECTED NG/ML
PH: 6 (ref 4.5–9)
SPECIFIC GRAVITY, QUAN: 1 (ref 1–1.03)
THC: NOT DETECTED NG/ML

## 2025-08-12 PROCEDURE — H0015 ALCOHOL AND/OR DRUG SERVICES: HCPCS

## 2025-08-13 ENCOUNTER — OFFICE VISIT (OUTPATIENT)
Dept: PSYCHIATRY | Facility: HOSPITAL | Age: 40
End: 2025-08-13
Payer: COMMERCIAL

## 2025-08-13 DIAGNOSIS — F33.0 MILD EPISODE OF RECURRENT MAJOR DEPRESSIVE DISORDER: ICD-10-CM

## 2025-08-13 DIAGNOSIS — F41.9 ANXIETY DISORDER, UNSPECIFIED TYPE: ICD-10-CM

## 2025-08-13 DIAGNOSIS — F10.20 ALCOHOL USE DISORDER, SEVERE, DEPENDENCE: Primary | ICD-10-CM

## 2025-08-13 DIAGNOSIS — Z79.899 MEDICATION MANAGEMENT: ICD-10-CM

## 2025-08-13 LAB
AMPHET+METHAMPHET UR QL: NEGATIVE
AMPHETAMINES UR QL: NEGATIVE
BARBITURATES UR QL SCN: NEGATIVE
BENZODIAZ UR QL SCN: NEGATIVE
BUPRENORPHINE SERPL-MCNC: NEGATIVE NG/ML
CANNABINOIDS SERPL QL: NEGATIVE
COCAINE UR QL: NEGATIVE
MDMA UR QL SCN: NEGATIVE
METHADONE UR QL SCN: NEGATIVE
MORPHINE/OPIATES SCREEN, URINE: NEGATIVE
OXYCODONE UR QL SCN: NEGATIVE
PCP UR QL SCN: NEGATIVE
PROPOXYPH UR QL SCN: NEGATIVE
REF LAB TEST METHOD: NORMAL
TRICYCLICS UR QL SCN: NEGATIVE

## 2025-08-13 PROCEDURE — H0015 ALCOHOL AND/OR DRUG SERVICES: HCPCS

## 2025-08-14 ENCOUNTER — OFFICE VISIT (OUTPATIENT)
Dept: PSYCHIATRY | Facility: HOSPITAL | Age: 40
End: 2025-08-14
Payer: COMMERCIAL

## 2025-08-14 DIAGNOSIS — F41.9 ANXIETY DISORDER, UNSPECIFIED TYPE: ICD-10-CM

## 2025-08-14 DIAGNOSIS — F10.20 ALCOHOL USE DISORDER, SEVERE, DEPENDENCE: Primary | ICD-10-CM

## 2025-08-14 DIAGNOSIS — F33.0 MILD EPISODE OF RECURRENT MAJOR DEPRESSIVE DISORDER: ICD-10-CM

## 2025-08-14 PROCEDURE — H0015 ALCOHOL AND/OR DRUG SERVICES: HCPCS

## 2025-08-15 LAB
6-ACETYLMORPHINE: NOT DETECTED NG/ML
ALCOHOL, ETHYL: NOT DETECTED MG/DL
AMPHETAMINE: NOT DETECTED NG/ML
BENZODIAZ BLD QL: NOT DETECTED NG/ML
BUPRENORPHINE SAL CFM-MCNC: NOT DETECTED NG/ML
COCAINE METABOLITE: NOT DETECTED NG/ML
CREATININE: 40.4 MG/DL
EDDP SERPL QL: NOT DETECTED NG/ML
FENTANYL-EIA: NOT DETECTED NG/ML
METHAMPHET/CREAT UR: 1759 NG/ML
METHAMPHETAMINE: DETECTED NG/ML
OPIATES: NOT DETECTED NG/ML
OXIDANTS: NOT DETECTED UG/ML
OXYCODONE: NOT DETECTED NG/ML
PCP: NOT DETECTED NG/ML
PH: 6.2 (ref 4.5–9)
REF LAB TEST METHOD: NORMAL
SPECIFIC GRAVITY, QUAN: 1 (ref 1–1.03)
THC: NOT DETECTED NG/ML

## 2025-08-18 ENCOUNTER — OFFICE VISIT (OUTPATIENT)
Dept: PSYCHIATRY | Facility: HOSPITAL | Age: 40
End: 2025-08-18
Payer: COMMERCIAL

## 2025-08-18 DIAGNOSIS — F41.9 ANXIETY DISORDER, UNSPECIFIED TYPE: ICD-10-CM

## 2025-08-18 DIAGNOSIS — Z79.899 MEDICATION MANAGEMENT: ICD-10-CM

## 2025-08-18 DIAGNOSIS — F10.20 ALCOHOL USE DISORDER, SEVERE, DEPENDENCE: Primary | ICD-10-CM

## 2025-08-18 DIAGNOSIS — F33.0 MILD EPISODE OF RECURRENT MAJOR DEPRESSIVE DISORDER: ICD-10-CM

## 2025-08-18 PROCEDURE — H0015 ALCOHOL AND/OR DRUG SERVICES: HCPCS

## 2025-08-19 ENCOUNTER — OFFICE VISIT (OUTPATIENT)
Dept: PSYCHIATRY | Facility: HOSPITAL | Age: 40
End: 2025-08-19
Payer: COMMERCIAL

## 2025-08-19 DIAGNOSIS — F10.20 ALCOHOL USE DISORDER, SEVERE, DEPENDENCE: Primary | ICD-10-CM

## 2025-08-19 DIAGNOSIS — F41.9 ANXIETY DISORDER, UNSPECIFIED TYPE: ICD-10-CM

## 2025-08-19 DIAGNOSIS — F33.0 MILD EPISODE OF RECURRENT MAJOR DEPRESSIVE DISORDER: ICD-10-CM

## 2025-08-19 PROCEDURE — H0015 ALCOHOL AND/OR DRUG SERVICES: HCPCS

## 2025-08-20 ENCOUNTER — OFFICE VISIT (OUTPATIENT)
Dept: PSYCHIATRY | Facility: HOSPITAL | Age: 40
End: 2025-08-20
Payer: COMMERCIAL

## 2025-08-20 DIAGNOSIS — F33.0 MILD EPISODE OF RECURRENT MAJOR DEPRESSIVE DISORDER: ICD-10-CM

## 2025-08-20 DIAGNOSIS — F41.9 ANXIETY DISORDER, UNSPECIFIED TYPE: ICD-10-CM

## 2025-08-20 DIAGNOSIS — F10.20 ALCOHOL USE DISORDER, SEVERE, DEPENDENCE: Primary | ICD-10-CM

## 2025-08-20 LAB
6-ACETYLMORPHINE: NOT DETECTED NG/ML
ALCOHOL, ETHYL: NOT DETECTED MG/DL
AMPHETAMINE: NOT DETECTED NG/ML
BENZODIAZ BLD QL: NOT DETECTED NG/ML
BUPRENORPHINE SAL CFM-MCNC: NOT DETECTED NG/ML
COCAINE METABOLITE: NOT DETECTED NG/ML
CREATININE: 57.5 MG/DL
EDDP SERPL QL: NOT DETECTED NG/ML
FENTANYL-EIA: NOT DETECTED NG/ML
METHAMPHET/CREAT UR: 1455 NG/ML
METHAMPHETAMINE: DETECTED NG/ML
OPIATES: NOT DETECTED NG/ML
OXIDANTS: NOT DETECTED UG/ML
OXYCODONE: NOT DETECTED NG/ML
PCP: NOT DETECTED NG/ML
PH: 6.4 (ref 4.5–9)
REF LAB TEST METHOD: NORMAL
SPECIFIC GRAVITY, QUAN: 1 (ref 1–1.03)
THC: NOT DETECTED NG/ML

## 2025-08-20 PROCEDURE — H0015 ALCOHOL AND/OR DRUG SERVICES: HCPCS

## 2025-08-21 ENCOUNTER — OFFICE VISIT (OUTPATIENT)
Dept: PSYCHIATRY | Facility: HOSPITAL | Age: 40
End: 2025-08-21
Payer: COMMERCIAL

## 2025-08-21 DIAGNOSIS — F41.9 ANXIETY DISORDER, UNSPECIFIED TYPE: ICD-10-CM

## 2025-08-21 DIAGNOSIS — F10.20 ALCOHOL USE DISORDER, SEVERE, DEPENDENCE: Primary | ICD-10-CM

## 2025-08-21 DIAGNOSIS — F33.0 MILD EPISODE OF RECURRENT MAJOR DEPRESSIVE DISORDER: ICD-10-CM

## 2025-08-21 PROCEDURE — S9480 INTENSIVE OUTPATIENT PSYCHIA: HCPCS

## 2025-08-25 ENCOUNTER — OFFICE VISIT (OUTPATIENT)
Dept: PSYCHIATRY | Facility: HOSPITAL | Age: 40
End: 2025-08-25
Payer: COMMERCIAL

## 2025-08-25 DIAGNOSIS — Z79.899 MEDICATION MANAGEMENT: ICD-10-CM

## 2025-08-25 DIAGNOSIS — F41.9 ANXIETY DISORDER, UNSPECIFIED TYPE: ICD-10-CM

## 2025-08-25 DIAGNOSIS — F33.0 MILD EPISODE OF RECURRENT MAJOR DEPRESSIVE DISORDER: ICD-10-CM

## 2025-08-25 DIAGNOSIS — F10.20 ALCOHOL USE DISORDER, SEVERE, DEPENDENCE: Primary | ICD-10-CM

## 2025-08-25 LAB
AMPHET+METHAMPHET UR QL: NEGATIVE
AMPHETAMINES UR QL: POSITIVE
BARBITURATES UR QL SCN: NEGATIVE
BENZODIAZ UR QL SCN: NEGATIVE
BUPRENORPHINE SERPL-MCNC: NEGATIVE NG/ML
CANNABINOIDS SERPL QL: NEGATIVE
COCAINE UR QL: NEGATIVE
MDMA UR QL SCN: NEGATIVE
METHADONE UR QL SCN: NEGATIVE
MORPHINE/OPIATES SCREEN, URINE: NEGATIVE
OXYCODONE UR QL SCN: NEGATIVE
PCP UR QL SCN: NEGATIVE
PROPOXYPH UR QL SCN: NEGATIVE
TRICYCLICS UR QL SCN: NEGATIVE

## 2025-08-25 PROCEDURE — H0015 ALCOHOL AND/OR DRUG SERVICES: HCPCS

## 2025-08-26 ENCOUNTER — OFFICE VISIT (OUTPATIENT)
Dept: PSYCHIATRY | Facility: HOSPITAL | Age: 40
End: 2025-08-26
Payer: COMMERCIAL

## 2025-08-26 DIAGNOSIS — F33.0 MILD EPISODE OF RECURRENT MAJOR DEPRESSIVE DISORDER: ICD-10-CM

## 2025-08-26 DIAGNOSIS — F41.9 ANXIETY DISORDER, UNSPECIFIED TYPE: ICD-10-CM

## 2025-08-26 DIAGNOSIS — F10.20 ALCOHOL USE DISORDER, SEVERE, DEPENDENCE: Primary | ICD-10-CM

## 2025-08-26 PROCEDURE — H0015 ALCOHOL AND/OR DRUG SERVICES: HCPCS

## 2025-08-27 LAB
6-ACETYLMORPHINE: NOT DETECTED NG/ML
ALCOHOL, ETHYL: NOT DETECTED MG/DL
AMPHETAMINE: NOT DETECTED NG/ML
BENZODIAZ BLD QL: NOT DETECTED NG/ML
BUPRENORPHINE SAL CFM-MCNC: NOT DETECTED NG/ML
COCAINE METABOLITE: NOT DETECTED NG/ML
CREATININE: 36.7 MG/DL
EDDP SERPL QL: NOT DETECTED NG/ML
FENTANYL-EIA: NOT DETECTED NG/ML
METHAMPHET/CREAT UR: >2500 NG/ML
METHAMPHETAMINE: DETECTED NG/ML
OPIATES: NOT DETECTED NG/ML
OXIDANTS: NOT DETECTED UG/ML
OXYCODONE: NOT DETECTED NG/ML
PCP: NOT DETECTED NG/ML
PH: 6.3 (ref 4.5–9)
REF LAB TEST METHOD: NORMAL
SPECIFIC GRAVITY, QUAN: 1 (ref 1–1.03)
THC: NOT DETECTED NG/ML